# Patient Record
Sex: MALE | Race: WHITE | Employment: OTHER | URBAN - METROPOLITAN AREA
[De-identification: names, ages, dates, MRNs, and addresses within clinical notes are randomized per-mention and may not be internally consistent; named-entity substitution may affect disease eponyms.]

---

## 2018-09-07 ENCOUNTER — HOSPITAL ENCOUNTER (OUTPATIENT)
Dept: LAB | Age: 53
Discharge: HOME | End: 2018-09-07

## 2018-09-07 ENCOUNTER — OFFICE VISIT (OUTPATIENT)
Dept: FAMILY MEDICINE CLINIC | Age: 53
End: 2018-09-07

## 2018-09-07 VITALS
BODY MASS INDEX: 34 KG/M2 | TEMPERATURE: 98.7 F | RESPIRATION RATE: 16 BRPM | WEIGHT: 251 LBS | OXYGEN SATURATION: 98 % | DIASTOLIC BLOOD PRESSURE: 94 MMHG | HEART RATE: 71 BPM | SYSTOLIC BLOOD PRESSURE: 130 MMHG | HEIGHT: 72 IN

## 2018-09-07 DIAGNOSIS — Z13.1 SCREENING FOR DIABETES MELLITUS: ICD-10-CM

## 2018-09-07 DIAGNOSIS — R07.9 CHEST PAIN, UNSPECIFIED TYPE: ICD-10-CM

## 2018-09-07 DIAGNOSIS — Z12.5 SCREENING FOR PROSTATE CANCER: ICD-10-CM

## 2018-09-07 DIAGNOSIS — Z00.00 ROUTINE MEDICAL EXAM: Primary | ICD-10-CM

## 2018-09-07 DIAGNOSIS — E78.5 HYPERLIPIDEMIA, UNSPECIFIED HYPERLIPIDEMIA TYPE: ICD-10-CM

## 2018-09-07 DIAGNOSIS — R21 RASH: ICD-10-CM

## 2018-09-07 DIAGNOSIS — R03.0 ELEVATED BLOOD PRESSURE READING: ICD-10-CM

## 2018-09-07 DIAGNOSIS — R13.19 OTHER DYSPHAGIA: ICD-10-CM

## 2018-09-07 DIAGNOSIS — Z12.11 SCREENING FOR COLON CANCER: ICD-10-CM

## 2018-09-07 DIAGNOSIS — B35.1 ONYCHOMYCOSIS: ICD-10-CM

## 2018-09-07 DIAGNOSIS — Z23 NEED FOR TDAP VACCINATION: ICD-10-CM

## 2018-09-07 LAB — XX-LABCORP SPECIMEN COL,LCBCF: NORMAL

## 2018-09-07 PROCEDURE — 99001 SPECIMEN HANDLING PT-LAB: CPT | Performed by: FAMILY MEDICINE

## 2018-09-07 RX ORDER — CETIRIZINE HCL 10 MG
10 TABLET ORAL AS NEEDED
COMMUNITY
End: 2021-08-06

## 2018-09-07 NOTE — PROGRESS NOTES
Subjective:   Jemal Minor is a 47 y.o. male presenting for his annual checkup. See other note for acute complains. Sexually active. ROS:  Feeling well. No dyspnea or chest pain on exertion at this time . No abdominal pain, change in bowel habits, black or bloody stools. No urinary tract or prostatic symptoms. No neurological complaints. Patient Active Problem List    Diagnosis Date Noted    Hyperlipidemia 09/07/2018    Onychomycosis 09/07/2018    Dysphagia 01/12/2011    Family history of ischemic heart disease 01/12/2011     Current Outpatient Prescriptions   Medication Sig Dispense Refill    cetirizine (ZYRTEC) 10 mg tablet Take  by mouth. Allergies   Allergen Reactions    Pcn [Penicillins] Unknown (comments)    Statins-Hmg-Coa Reductase Inhibitors Other (comments)     Generalize fatigue and body ache. Probably taken simvastatin. Past Medical History:   Diagnosis Date    Hypercholesterolemia      Past Surgical History:   Procedure Laterality Date    HX ORTHOPAEDIC      ankle, arm    HX WISDOM TEETH EXTRACTION       Family History   Problem Relation Age of Onset    Diabetes Father     Heart Attack Father 61    Stroke Father 64    Hypertension Father    [place] Spell Arthritis-rheumatoid Mother     Dementia Maternal Grandmother     Heart Disease Maternal Grandfather 54     Social History   Substance Use Topics    Smoking status: Never Smoker    Smokeless tobacco: Never Used    Alcohol use Yes       Objective:     Visit Vitals    BP (!) 130/94 (BP 1 Location: Left arm, BP Patient Position: Sitting)    Pulse 71    Temp 98.7 °F (37.1 °C) (Oral)    Resp 16    Ht 6' (1.829 m)    Wt 251 lb (113.9 kg)    SpO2 98%    BMI 34.04 kg/m2     The patient appears well, alert, oriented x 3, in no distress. ENT normal.  Neck supple. No adenopathy or thyromegaly. BRITANY. Lungs are clear, good air entry, no wheezes, rhonchi or rales.  S1 and S2 normal, no murmurs, regular rate and rhythm. Abdomen is soft without tenderness, guarding, mass or organomegaly.  exam: no penile lesions or discharge, no testicular masses or tenderness, no hernias, rectum normal, no masses, prostate normal size, symmetric, no nodules or tenderness. External hemorrhoid noted. Extremities show no edema, normal peripheral pulses. Neurological is normal without focal findings.  exam done in presence of nurse LG. Assessment/Plan:       ICD-10-CM ICD-9-CM    1. Routine medical exam Z00.00 V70.0    2. Screening for colon cancer Z12.11 V76.51 REFERRAL TO GASTROENTEROLOGY      CANCELED: REFERRAL TO GASTROENTEROLOGY   3. Screening for prostate cancer Z12.5 V76.44 PSA SCREENING   4. BMI 34.0-34.9,adult Z68.34 V85.34    5. Screening for diabetes mellitus Z13.1 V77.1 HEMOGLOBIN A1C WITH EAG   6. Need for Tdap vaccination Z23 V06.1 TETANUS, DIPHTHERIA TOXOIDS AND ACELLULAR PERTUSSIS VACCINE (TDAP), IN INDIVIDS. >=7, IM   Pt understood and agree with the plan   Review    Follow-up Disposition:  Return in about 3 months (around 12/7/2018). Radha Farmer

## 2018-09-07 NOTE — PATIENT INSTRUCTIONS
Chest Pain: Care Instructions  Your Care Instructions    There are many things that can cause chest pain. Some are not serious and will get better on their own in a few days. But some kinds of chest pain need more testing and treatment. Your doctor may have recommended a follow-up visit in the next 8 to 12 hours. If you are not getting better, you may need more tests or treatment. Even though your doctor has released you, you still need to watch for any problems. The doctor carefully checked you, but sometimes problems can develop later. If you have new symptoms or if your symptoms do not get better, get medical care right away. If you have worse or different chest pain or pressure that lasts more than 5 minutes or you passed out (lost consciousness), call 911 or seek other emergency help right away. A medical visit is only one step in your treatment. Even if you feel better, you still need to do what your doctor recommends, such as going to all suggested follow-up appointments and taking medicines exactly as directed. This will help you recover and help prevent future problems. How can you care for yourself at home? · Rest until you feel better. · Take your medicine exactly as prescribed. Call your doctor if you think you are having a problem with your medicine. · Do not drive after taking a prescription pain medicine. When should you call for help? Call 911 if:    · You passed out (lost consciousness).     · You have severe difficulty breathing.     · You have symptoms of a heart attack. These may include:  ¨ Chest pain or pressure, or a strange feeling in your chest.  ¨ Sweating. ¨ Shortness of breath. ¨ Nausea or vomiting. ¨ Pain, pressure, or a strange feeling in your back, neck, jaw, or upper belly or in one or both shoulders or arms. ¨ Lightheadedness or sudden weakness. ¨ A fast or irregular heartbeat.   After you call 911, the  may tell you to chew 1 adult-strength or 2 to 4 low-dose aspirin. Wait for an ambulance. Do not try to drive yourself.    Call your doctor today if:    · You have any trouble breathing.     · Your chest pain gets worse.     · You are dizzy or lightheaded, or you feel like you may faint.     · You are not getting better as expected.     · You are having new or different chest pain. Where can you learn more? Go to http://swapnil-otilia.info/. Enter A120 in the search box to learn more about \"Chest Pain: Care Instructions. \"  Current as of: November 20, 2017  Content Version: 11.7  © 9660-5467 [employer]. Care instructions adapted under license by [employer] (which disclaims liability or warranty for this information). If you have questions about a medical condition or this instruction, always ask your healthcare professional. [address] any warranty or liability for your use of this information. Low Sodium Diet (2,000 Milligram): Care Instructions  Your Care Instructions    Too much sodium causes your body to hold on to extra water. This can raise your blood pressure and force your heart and kidneys to work harder. In very serious cases, this could cause you to be put in the hospital. It might even be life-threatening. By limiting sodium, you will feel better and lower your risk of serious problems. The most common source of sodium is salt. People get most of the salt in their diet from canned, prepared, and packaged foods. Fast food and restaurant meals also are very high in sodium. Your doctor will probably limit your sodium to less than 2,000 milligrams (mg) a day. This limit counts all the sodium in prepared and packaged foods and any salt you add to your food. Follow-up care is a key part of your treatment and safety. Be sure to make and go to all appointments, and call your doctor if you are having problems.  It's also a good idea to know your test results and keep a list of the medicines you take. How can you care for yourself at home? Read food labels  · Read labels on cans and food packages. The labels tell you how much sodium is in each serving. Make sure that you look at the serving size. If you eat more than the serving size, you have eaten more sodium. · Food labels also tell you the Percent Daily Value for sodium. Choose products with low Percent Daily Values for sodium. · Be aware that sodium can come in forms other than salt, including monosodium glutamate (MSG), sodium citrate, and sodium bicarbonate (baking soda). MSG is often added to Asian food. When you eat out, you can sometimes ask for food without MSG or added salt. Buy low-sodium foods  · Buy foods that are labeled \"unsalted\" (no salt added), \"sodium-free\" (less than 5 mg of sodium per serving), or \"low-sodium\" (less than 140 mg of sodium per serving). Foods labeled \"reduced-sodium\" and \"light sodium\" may still have too much sodium. Be sure to read the label to see how much sodium you are getting. · Buy fresh vegetables, or frozen vegetables without added sauces. Buy low-sodium versions of canned vegetables, soups, and other canned goods. Prepare low-sodium meals  · Cut back on the amount of salt you use in cooking. This will help you adjust to the taste. Do not add salt after cooking. One teaspoon of salt has about 2,300 mg of sodium. · Take the salt shaker off the table. · Flavor your food with garlic, lemon juice, onion, vinegar, herbs, and spices. Do not use soy sauce, lite soy sauce, steak sauce, onion salt, garlic salt, celery salt, mustard, or ketchup on your food. · Use low-sodium salad dressings, sauces, and ketchup. Or make your own salad dressings and sauces without adding salt. · Use less salt (or none) when recipes call for it. You can often use half the salt a recipe calls for without losing flavor. Other foods such as rice, pasta, and grains do not need added salt.   · Rinse canned vegetables, and cook them in fresh water. This removes some-but not all-of the salt. · Avoid water that is naturally high in sodium or that has been treated with water softeners, which add sodium. Call your local water company to find out the sodium content of your water supply. If you buy bottled water, read the label and choose a sodium-free brand. Avoid high-sodium foods  · Avoid eating:  ¨ Smoked, cured, salted, and canned meat, fish, and poultry. ¨ Ham, barrett, hot dogs, and luncheon meats. ¨ Regular, hard, and processed cheese and regular peanut butter. ¨ Crackers with salted tops, and other salted snack foods such as pretzels, chips, and salted popcorn. ¨ Frozen prepared meals, unless labeled low-sodium. ¨ Canned and dried soups, broths, and bouillon, unless labeled sodium-free or low-sodium. ¨ Canned vegetables, unless labeled sodium-free or low-sodium. ¨ [place] fries, pizza, tacos, and other fast foods. ¨ Pickles, olives, ketchup, and other condiments, especially soy sauce, unless labeled sodium-free or low-sodium. Where can you learn more? Go to http://swapnil-otilia.info/. Enter B084 in the search box to learn more about \"Low Sodium Diet (2,000 Milligram): Care Instructions. \"  Current as of: May 12, 2017  Content Version: 11.7  © 3516-4940 [employer]. Care instructions adapted under license by [employer] (which disclaims liability or warranty for this information). If you have questions about a medical condition or this instruction, always ask your healthcare professional. [place] [address] any warranty or liability for your use of this information. Learning About Low-Fat Eating  What is low-fat eating? Most food has some fat in it. Your body needs some fat to be healthy. But some kinds of fats are healthier than others. In a low-fat eating plan, you try to choose healthier fats and eat fewer unhealthy fats.  Healthy fats include olive and canola oil. Try to avoid eating too much saturated fat (such as in cheese and meats) and trans fat (a type of fat found in many packaged snack foods and other baked goods). You do not need to cut all fat from your diet. But you can make healthier choices about the types and amount of fat you eat. Even though it is a good idea to choose healthier fats, it is still important to be careful of how much fat you eat, because all fats are high in calories. What are the different types of fats? Unhealthy fats  · Saturated fat. Saturated fats are mostly in animal foods, such as meat and dairy foods. Tropical oils, such as coconut oil, palm oil, and cocoa butter, are also saturated fats. · Trans fat. Trans fats include shortening, partially hydrogenated vegetable oils, and hydrogenated vegetable oils. Trans fats are made when a liquid fat is turned into a solid fat (for example, when corn oil is made into stick margarine). They are in many processed foods, such as cookies, crackers, and snack foods. Healthy fats  · Monounsaturated fat. Monounsaturated fats are liquid at room temperature but get solid when refrigerated. Eating foods that are high in this fat may help lower your \"bad\" (LDL) cholesterol, keep your \"good\" (HDL) cholesterol level up, and lower your chances of getting coronary artery disease. This fat is found in canola oil, olive oil, peanut oil, olives, avocados, nuts, and nut butters. · Polyunsaturated fat. Polyunsaturated fats are liquid at room temperature. They are in safflower, sunflower, and corn oils. They are also the main fat in seafood. Omega-3 fatty acids are types of polyunsaturated fat. Eating fish may lower your chances of getting coronary artery disease. Fatty fish such as salmon and mackerel contain these healthy fatty acids. So do ground flaxseeds and flaxseed oil, soybeans, walnuts, and seeds. Why cut down on unhealthy fats?   Eating foods that contain saturated fats can raise the LDL (\"bad\") cholesterol in your blood. Having a high level of LDL cholesterol increases your chance of hardening of the arteries (atherosclerosis), which can lead to heart disease, heart attack, and stroke. Trans fat raises the level of \"bad\" LDL cholesterol in your blood and may lower the \"good\" HDL cholesterol in your blood. Trans fat can raise your risk of heart disease, heart attack, and stroke. In general:  · No more than 10% of your daily calories should come from saturated fat. This is about 20 grams in a 2,000-calorie diet. · No more than 10% of your daily calories should come from polyunsaturated fat. This is about 20 grams in a 2,000-calorie diet. · Monounsaturated fats can be up to 15% of your daily calories. This is about 25 to 30 grams in a 2,000-calorie diet. If you're not sure how much fat you should be eating or how many calories you need each day to stay at a healthy weight, talk to a registered dietitian. He or she can help you create a plan that's right for you. What can you do to cut down on fat? Foods like cheese, butter, sausage, and desserts can have a lot of unhealthy fats. Try these tips for healthier meals at home and when you eat out. At home  · Fill up on fruits, vegetables, and whole grains. · Think of meat as a side dish instead of as the main part of your meal.  · When you do eat meat, make it extra-lean ground beef (97% lean), ground turkey breast (without skin added), meats with fat trimmed off before cooking, or skinless chicken. · Try main dishes that use whole wheat pasta, brown rice, dried beans, or vegetables. · Use cooking methods that use little or no fat, such as broiling, steaming, or grilling. Use cooking spray instead of oil. If you use oil, use a monounsaturated oil, such as canola or olive oil. · Read food labels on canned, bottled, or packaged foods. Choose those with little saturated fat and no trans fat.   When eating out at a restaurant  · Order foods that are broiled or poached instead of fried or breaded. · Cut back on the amount of butter or margarine that you use on bread. Use small amounts of olive oil instead. · Order sauces, gravies, and salad dressings on the side, and use only a little. · When you order pasta, choose tomato sauce instead of cream sauce. · Ask for salsa with your baked potato instead of sour cream, butter, cheese, or barrett. Where can you learn more? Go to http://swapnil-otiila.info/. Enter V534 in the search box to learn more about \"Learning About Low-Fat Eating. \"  Current as of: May 12, 2017  Content Version: 11.7  © 8161-3953 [employer]. Care instructions adapted under license by [employer] (which disclaims liability or warranty for this information). If you have questions about a medical condition or this instruction, always ask your healthcare professional. [place] [address] any warranty or liability for your use of this information. Eating Healthy Foods: Care Instructions  Your Care Instructions    Eating healthy foods can help lower your risk for disease. Healthy food gives you energy and keeps your heart strong, your brain active, your muscles working, and your bones strong. A healthy diet includes a variety of foods from the basic food groups: grains, vegetables, fruits, milk and milk products, and meat and beans. Some people may eat more of their favorite foods from only one food group and, as a result, miss getting the nutrients they need. So, it is important to pay attention not only to what you eat but also to what you are missing from your diet. You can eat a healthy, balanced diet by making a few small changes. Follow-up care is a key part of your treatment and safety. Be sure to make and go to all appointments, and call your doctor if you are having problems. It's also a good idea to know your test results and keep a list of the medicines you take.   How can you care for yourself at home? Look at what you eat  · Keep a food diary for a week or two and record everything you eat or drink. Track the number of servings you eat from each food group. · For a balanced diet every day, eat a variety of:  ¨ 6 or more ounce-equivalents of grains, such as cereals, breads, crackers, rice, or pasta, every day. An ounce-equivalent is 1 slice of bread, 1 cup of ready-to-eat cereal, or ½ cup of cooked rice, cooked pasta, or cooked cereal.  ¨ 2½ cups of vegetables, especially:  § Dark-green vegetables such as broccoli and spinach. § Orange vegetables such as carrots and sweet potatoes. § Dry beans (such as rees and kidney beans) and peas (such as lentils). ¨ 2 cups of fresh, frozen, or canned fruit. A small apple or 1 banana or orange equals 1 cup. ¨ 3 cups of nonfat or low-fat milk, yogurt, or other milk products. ¨ 5½ ounces of meat and beans, such as chicken, fish, lean meat, beans, nuts, and seeds. One egg, 1 tablespoon of peanut butter, ½ ounce nuts or seeds, or ¼ cup of cooked beans equals 1 ounce of meat. · Learn how to read food labels for serving sizes and ingredients. Fast-food and convenience-food meals often contain few or no fruits or vegetables. Make sure you eat some fruits and vegetables to make the meal more nutritious. · Look at your food diary. For each food group, add up what you have eaten and then divide the total by the number of days. This will give you an idea of how much you are eating from each food group. See if you can find some ways to change your diet to make it more healthy. Start small  · Do not try to make dramatic changes to your diet all at once. You might feel that you are missing out on your favorite foods and then be more likely to fail. · Start slowly, and gradually change your habits. Try some of the following:  ¨ Use whole wheat bread instead of white bread. ¨ Use nonfat or low-fat milk instead of whole milk.   ¨ Eat brown rice instead of white rice, and eat whole wheat pasta instead of white-flour pasta. ¨ Try low-fat cheeses and low-fat yogurt. ¨ Add more fruits and vegetables to meals and have them for snacks. ¨ Add lettuce, tomato, cucumber, and onion to sandwiches. ¨ Add fruit to yogurt and cereal.  Enjoy food  · You can still eat your favorite foods. You just may need to eat less of them. If your favorite foods are high in fat, salt, and sugar, limit how often you eat them, but do not cut them out entirely. · Eat a wide variety of foods. Make healthy choices when eating out  · The type of restaurant you choose can help you make healthy choices. Even fast-food chains are now offering more low-fat or healthier choices on the menu. · Choose smaller portions, or take half of your meal home. · When eating out, try:  ¨ A veggie pizza with a whole wheat crust or grilled chicken (instead of sausage or pepperoni). ¨ Pasta with roasted vegetables, grilled chicken, or marinara sauce instead of cream sauce. ¨ A vegetable wrap or grilled chicken wrap. ¨ Broiled or poached food instead of fried or breaded items. Make healthy choices easy  · Buy packaged, prewashed, ready-to-eat fresh vegetables and fruits, such as baby carrots, salad mixes, and chopped or shredded broccoli and cauliflower. · Buy packaged, presliced fruits, such as melon or pineapple. · Choose 100% fruit or vegetable juice instead of soda. Limit juice intake to 4 to 6 oz (½ to ¾ cup) a day. · Blend low-fat yogurt, fruit juice, and canned or frozen fruit to make a smoothie for breakfast or a snack. Where can you learn more? Go to http://swapnil-otilia.info/. Enter T756 in the search box to learn more about \"Eating Healthy Foods: Care Instructions. \"  Current as of: May 12, 2017  Content Version: 11.7  © 3952-8118 [email or web address].  Care instructions adapted under license by [employer] (which disclaims liability or warranty for this information). If you have questions about a medical condition or this instruction, always ask your healthcare professional. [address] any warranty or liability for your use of this information. Learning About Physical Activity  What is physical activity? Physical activity is any kind of activity that gets your body moving. The types of physical activity that can help you get fit and stay healthy include:  · Aerobic or \"cardio\" activities that make your heart beat faster and make you breathe harder, such as brisk walking, riding a bike, or running. Aerobic activities strengthen your heart and lungs and build up your endurance. · Strength training activities that make your muscles work against, or \"resist,\" something, such as lifting weights or doing push-ups. These activities help tone and strengthen your muscles. · Stretches that allow you to move your joints and muscles through their full range of motion. Stretching helps you be more flexible and avoid injury. What are the benefits of physical activity? Being active is one of the best things you can do to get fit and stay healthy. It helps you to:  · Feel stronger and have more energy to do all the things you like to do. · Focus better at school or work and perform better in sports. · Feel, think, and sleep better. · Reach and stay at a healthy weight. · Lose fat and build lean muscle. · Lower your risk for serious health problems. · Keep your bones, muscles, and joints strong. Being fit lets you do more physical activity. And it lets you work out harder without as much effort. How can you make physical activity part of your life? Get at least 30 minutes of exercise on most days of the week. Walking is a good choice. You also may want to do other activities, such as running, swimming, cycling, or playing tennis or team sports.   Pick activities that you like-ones that make your heart beat faster, your muscles stronger, and your muscles and joints more flexible. If you find more than one thing you like doing, do them all. You don't have to do the same thing every day. Get your heart pumping every day. Any activity that makes your heart beat faster and keeps it at that rate for a while counts. Here are some great ways to get your heart beating faster:  · Go for a brisk walk, run, or bike ride. · Go for a hike or swim. · Go in-line skating. · Play a game of touch football, basketball, or soccer. · Ride a bike. · Play tennis or racquetball. · Climb stairs. Even some household chores can be aerobic-just do them at a faster pace. Vacuuming, raking or mowing the lawn, sweeping the garage, and washing and waxing the car all can help get your heart rate up. Strengthen your muscles during the week. You don't have to lift heavy weights or grow big, bulky muscles to get stronger. Doing a few simple activities that make your muscles work against, or \"resist,\" something can help you get stronger. For example, you can:  · Do push-ups or sit-ups, which use your own body weight as resistance. · Lift weights or dumbbells or use stretch bands at home or in a gym or community center. Stretch your muscles often. Stretching will help you as you become more active. It can help you stay flexible, loosen tight muscles, and avoid injury. It can also help improve your balance and posture and can be a great way to relax. Be sure to stretch the muscles you'll be using when you work out. It's best to warm your muscles slightly before you stretch them. Walk or do some other light aerobic activity for a few minutes, and then start stretching. When you stretch your muscles:  · Do it slowly. Stretching is not about going fast or making sudden movements. · Don't push or bounce during a stretch. · Hold each stretch for at least 15 to 30 seconds, if you can. You should feel a stretch in the muscle, but not pain.   · Breathe out as you do the stretch. Then breathe in as you hold the stretch. Don't hold your breath. If you're worried about how more activity might affect your health, have a checkup before you start. Follow any special advice your doctor gives you for getting a smart start. Where can you learn more? Go to http://swapnil-otilia.info/. Enter D724 in the search box to learn more about \"Learning About Physical Activity. \"  Current as of: December 7, 2017  Content Version: 11.7  © 7525-8499 [employer]. Care instructions adapted under license by [employer] (which disclaims liability or warranty for this information). If you have questions about a medical condition or this instruction, always ask your healthcare professional. [address] any warranty or liability for your use of this information. Toenail Fungus: Care Instructions  Your Care Instructions  A toenail that is infected by a fungus usually turns white or yellow. As the fungus spreads, the nail turns a darker color and gets thicker, and its edges start to turn ragged and crumble. A bad infection can cause toe pain, and the nail may pull away from the toe. Toenails that are exposed to moisture and warmth a lot are more likely to get infected by a fungus. This can happen from wearing sweaty shoes often and from walking barefoot on shower floors. It is hard to treat toenail fungus, and the infection can return after it has cleared up. But medicines can sometimes get rid of toenail fungus for good. If the infection is very bad, or if it causes a lot of pain, you may need to have the nail removed. Follow-up care is a key part of your treatment and safety. Be sure to make and go to all appointments, and call your doctor if you are having problems. It's also a good idea to know your test results and keep a list of the medicines you take. How can you care for yourself at home?   · Take your medicines exactly as prescribed. Call your doctor if you have any problems with your medicine. You will get more details on the specific medicines your doctor prescribes. · If your doctor gave you a cream or liquid to put on your toenail, use it exactly as directed. · Wash your feet often, and wash your hands after touching your feet. · Keep your toenails clean and dry. Dry your feet completely after you bathe and before you put on shoes and socks. · Keep your toenails trimmed. · Change socks often. Wear dry socks that absorb moisture. · Do not go barefoot in public places. · Use a spray or powder that fights fungus on your feet and in your shoes. · Do not pick at the skin around your nails. · Do not use nail polish or fake nails on your toenails. When should you call for help? Call your doctor now or seek immediate medical care if:    · You have signs of infection, such as:  ¨ Increased pain, swelling, warmth, or redness. ¨ Red streaks leading from the site. ¨ Pus draining from the site. ¨ A fever.     · You have new or increased toe pain.    Watch closely for changes in your health, and be sure to contact your doctor if:    · You do not get better as expected. Where can you learn more? Go to http://swapnil-otilia.info/. Enter D202 in the search box to learn more about \"Toenail Fungus: Care Instructions. \"  Current as of: October 5, 2017  Content Version: 11.7  © 0849-8511 [employer]. Care instructions adapted under license by [employer] (which disclaims liability or warranty for this information). If you have questions about a medical condition or this instruction, always ask your healthcare professional. [place] [address] any warranty or liability for your use of this information. Tdap (Tetanus, Diphtheria, Pertussis) Vaccine: What You Need to Know  Why get vaccinated? Tetanus, diphtheria, and pertussis are very serious diseases.  Tdap vaccine can protect us from these diseases. And Tdap vaccine given to pregnant women can protect  babies against pertussis. Tetanus (lockjaw) is rare in the [employer] today. It causes painful muscle tightening and stiffness, usually all over the body. · It can lead to tightening of muscles in the head and neck so you can't open your mouth, swallow, or sometimes even breathe. Tetanus kills about 1 out of 10 people who are infected even after receiving the best medical care. Diphtheria is also rare in the [place] today. It can cause a thick coating to form in the back of the throat. · It can lead to breathing problems, heart failure, paralysis, and death. Pertussis (whooping cough) causes severe coughing spells, which can cause difficulty breathing, vomiting, and disturbed sleep. · It can also lead to weight loss, incontinence, and rib fractures. Up to 2 in 100 adolescents and 5 in 100 adults with pertussis are hospitalized or have complications, which could include pneumonia or death. These diseases are caused by bacteria. Diphtheria and pertussis are spread from person to person through secretions from coughing or sneezing. Tetanus enters the body through cuts, scratches, or wounds. Before vaccines, as many as 200,000 cases of diphtheria, 200,000 cases of pertussis, and hundreds of cases of tetanus were reported in the [place] each year. Since vaccination began, reports of cases for tetanus and diphtheria have dropped by about 99% and for pertussis by about 80%. Tdap vaccine  The Tdap vaccine can protect adolescents and adults from tetanus, diphtheria, and pertussis. One dose of Tdap is routinely given at age 6 or 15. People who did not get Tdap at that age should get it as soon as possible. Tdap is especially important for health care professionals and anyone having close contact with a baby younger than 12 months.   Pregnant women should get a dose of Tdap during every pregnancy, to protect the  from pertussis. Infants are most at risk for severe, life-threatening complications from pertussis. Another vaccine, called Td, protects against tetanus and diphtheria, but not pertussis. A Td booster should be given every 10 years. Tdap may be given as one of these boosters if you have never gotten Tdap before. Tdap may also be given after a severe cut or burn to prevent tetanus infection. Your doctor or the person giving you the vaccine can give you more information. Tdap may safely be given at the same time as other vaccines. Some people should not get this vaccine  · A person who has ever had a life-threatening allergic reaction after a previous dose of any diphtheria-, tetanus-, or pertussis-containing vaccine, OR has a severe allergy to any part of this vaccine, should not get Tdap vaccine. Tell the person giving the vaccine about any severe allergies. · Anyone who had coma or long repeated seizures within 7 days after a childhood dose of DTP or DTaP, or a previous dose of Tdap, should not get Tdap, unless a cause other than the vaccine was found. They can still get Td. · Talk to your doctor if you:  ¨ Have seizures or another nervous system problem. ¨ Had severe pain or swelling after any vaccine containing diphtheria, tetanus, or pertussis. ¨ Ever had a condition called Guillain-Barré Syndrome (GBS). ¨ Aren't feeling well on the day the shot is scheduled. Risks  With any medicine, including vaccines, there is a chance of side effects. These are usually mild and go away on their own. Serious reactions are also possible but are rare. Most people who get Tdap vaccine do not have any problems with it.   Mild problems following Tdap  (Did not interfere with activities)  · Pain where the shot was given (about 3 in 4 adolescents or 2 in 3 adults)  · Redness or swelling where the shot was given (about 1 person in 5)  · Mild fever of at least 100.4°F (up to about 1 in 25 adolescents or 1 in 100 adults)  · Headache (about 3 or 4 people in 10)  · Tiredness (about 1 person in 3 or 4)  · Nausea, vomiting, diarrhea, stomachache (up to 1 in 4 adolescents or 1 in 10 adults)  · Chills, sore joints (about 1 person in 10)  · Body aches (about 1 person in 3 or 4)  · Rash, swollen glands (uncommon)  Moderate problems following Tdap  (Interfered with activities, but did not require medical attention)  · Pain where the shot was given (up to 1 in 5 or 6)  · Redness or swelling where the shot was given (up to about 1 in 16 adolescents or 1 in 12 adults)  · Fever over 102°F (about 1 in 100 adolescents or 1 in 250 adults)  · Headache (about 1 in 7 adolescents or 1 in 10 adults)  · Nausea, vomiting, diarrhea, stomachache (up to 1 to 3 people in 100)  · Swelling of the entire arm where the shot was given (up to about 1 in 500)  Severe problems following Tdap  (Unable to perform usual activities; required medical attention)  · Swelling, severe pain, bleeding and redness in the arm where the shot was given (rare)  Problems that could happen after any vaccine:  · People sometimes faint after a medical procedure, including vaccination. Sitting or lying down for about 15 minutes can help prevent fainting, and injuries caused by a fall. Tell your doctor if you feel dizzy or have vision changes or ringing in the ears. · Some people get severe pain in the shoulder and have difficulty moving the arm where a shot was given. This happens very rarely. · Any medication can cause a severe allergic reaction. Such reactions from a vaccine are very rare, estimated at fewer than 1 in a million doses, and would happen within a few minutes to a few hours after the vaccination. As with any medicine, there is a very remote chance of a vaccine causing a serious injury or death. The safety of vaccines is always being monitored. For more information, visit: www.cdc.gov/vaccinesafety. What if there is a serious problem?   What should I look for?  · Look for anything that concerns you, such as signs of a severe allergic reaction, very high fever, or unusual behavior. Signs of a severe allergic reaction can include hives, swelling of the face and throat, difficulty breathing, a fast heartbeat, dizziness, and weakness. These would usually start a few minutes to a few hours after the vaccination. What should I do? · If you think it is a severe allergic reaction or other emergency that can't wait, call 9-1-1 or get the person to the nearest hospital. Otherwise, call your doctor. · Afterward, the reaction should be reported to the Vaccine Adverse Event Reporting System (VAERS). Your doctor might file this report, or you can do it yourself through the VAERS web site at www.vaers. hhs.gov, or by calling 5-757.943.3236. VAERS does not give medical advice. The National Vaccine Injury Compensation Program  The National Vaccine Injury Compensation Program (VICP) is a federal program that was created to compensate people who may have been injured by certain vaccines. Persons who believe they may have been injured by a vaccine can learn about the program and about filing a claim by calling 7-199.356.4049 or visiting the [facility] website at [email or web address]. There is a time limit to file a claim for compensation. How can I learn more? · Ask your doctor. He or she can give you the vaccine package insert or suggest other sources of information. · Call your local or state health department. · Contact the Centers for Disease Control and Prevention (CDC):  ¨ Call 9-446.244.8888 (1-800-CDC-INFO) or  ¨ Visit CDC's website at www.cdc.gov/vaccines  Vaccine Information Statement (Interim)  Tdap Vaccine  (2/24/15)  [address] [facility] [id number]-01  Department of Health and Human Services  Centers for Disease Control and Prevention  Many Vaccine Information Statements are available in [place] and other languages. See www.immunize.org/vis.   Muchas hojas de información sobre vacunas están disponibles en español y en otros idiomas. Visite www.immunize.org/vis. Care instructions adapted under license by [employer] (which disclaims liability or warranty for this information). If you have questions about a medical condition or this instruction, always ask your healthcare professional. [address] any warranty or liability for your use of this information.

## 2018-09-07 NOTE — MR AVS SNAPSHOT
[address] [place] [address] [address] [place] 
[phone] Patient: Carl White MRN: [id number] :[id number] Visit Information Date & Time Provider Department Dept. Phone Encounter #  
 2018 11:00 AM Hilario Monroy, [address] [facility] [phone] [id number] Follow-up Instructions Return in about 3 months (around 2018). Upcoming Health Maintenance Date Due COLONOSCOPY 1984 DTaP/Tdap/Td series (2 - Td) 2016 Influenza Age 5 to Adult 2018 Allergies as of 2018  Review Complete On: 2018 By: Hilario Monroy MD  
  
 Severity Noted Reaction Type Reactions Pcn [Penicillins]  2011    Unknown (comments) Statins-hmg-coa Reductase Inhibitors  2018    Other (comments) Generalize fatigue and body ache. Probably taken simvastatin. Current Immunizations  Reviewed on 2012 Name Date Influenza Vaccine Split 2009 TB Skin Test (PPD) Intradermal 2013 11:28 AM  
 TDAP Vaccine 2006 Not reviewed this visit You Were Diagnosed With   
  
 Codes Comments Routine medical exam    -  Primary ICD-10-CM: Z00.00 ICD-9-CM: V70.0 Chest pain, unspecified type     ICD-10-CM: R07.9 ICD-9-CM: 786.50 Hyperlipidemia, unspecified hyperlipidemia type     ICD-10-CM: E78.5 ICD-9-CM: 272.4 Screening for colon cancer     ICD-10-CM: Z12.11 ICD-9-CM: V76.51 Screening for prostate cancer     ICD-10-CM: Z12.5 ICD-9-CM: V76.44 BMI 34.0-34.9,adult     ICD-10-CM: H49.35 
ICD-9-CM: V85.34 Screening for diabetes mellitus     ICD-10-CM: Z13.1 ICD-9-CM: V77.1 Onychomycosis     ICD-10-CM: B35.1 ICD-9-CM: 110.1 Rash     ICD-10-CM: R21 
ICD-9-CM: 782.1 Elevated blood pressure reading     ICD-10-CM: R03.0 ICD-9-CM: 796.2 Other dysphagia     ICD-10-CM: R13.19 ICD-9-CM: 787.29 Vitals BP Pulse Temp Resp Height(growth percentile) Weight(growth percentile) (!) 130/94 (BP 1 Location: Left arm, BP Patient Position: Sitting) 71 98.7 °F (37.1 °C) (Oral) 16 6' (1.829 m) 251 lb (113.9 kg) SpO2 BMI Smoking Status 98% 34.04 kg/m2 Never Smoker Vitals History BMI and BSA Data Body Mass Index Body Surface Area 34.04 kg/m 2 2.41 m 2 Preferred Pharmacy Pharmacy Name Phone [facility] [phone] Your Updated Medication List  
  
   
This list is accurate as of 9/7/18 11:59 AM.  Always use your most recent med list.  
  
  
  
  
 ZyrTEC 10 mg tablet Generic drug:  cetirizine Take  by mouth. We Performed the Following PSA SCREENING [ [facility]] REFERRAL TO CARDIOLOGY [[facility]77 Custom] REFERRAL TO GASTROENTEROLOGY [CVW43 Custom] Comments:  
 Or first available Follow-up Instructions Return in about 3 months (around 12/7/2018). To-Do List   
 09/07/2018 Lab:  HEMOGLOBIN A1C WITH EAG   
  
 09/07/2018 Lab:  LIPID PANEL Referral Information Referral ID Referred By Referred To  
  
 [id number] [address] [place] [address] Phone: [phone] Fax: [phone] Visits Status Start Date End Date 1 Authorized 9/7/18 9/7/19 If your referral has a status of pending review or denied, additional information will be sent to support the outcome of this decision. Referral ID Referred By Referred To  
 [id number] [facility] R Not Available Visits Status Start Date End Date 1 New Request 9/7/18 9/7/19 If your referral has a status of pending review or denied, additional information will be sent to support the outcome of this decision. Patient Instructions Chest Pain: Care Instructions Your Care Instructions There are many things that can cause chest pain. Some are not serious and will get better on their own in a few days. But some kinds of chest pain need more testing and treatment. Your doctor may have recommended a follow-up visit in the next 8 to 12 hours. If you are not getting better, you may need more tests or treatment. Even though your doctor has released you, you still need to watch for any problems. The doctor carefully checked you, but sometimes problems can develop later. If you have new symptoms or if your symptoms do not get better, get medical care right away. If you have worse or different chest pain or pressure that lasts more than 5 minutes or you passed out (lost consciousness), call 911 or seek other emergency help right away. A medical visit is only one step in your treatment. Even if you feel better, you still need to do what your doctor recommends, such as going to all suggested follow-up appointments and taking medicines exactly as directed. This will help you recover and help prevent future problems. How can you care for yourself at home? · Rest until you feel better. · Take your medicine exactly as prescribed. Call your doctor if you think you are having a problem with your medicine. · Do not drive after taking a prescription pain medicine. When should you call for help? Call 911 if: 
  · You passed out (lost consciousness).  
  · You have severe difficulty breathing.  
  · You have symptoms of a heart attack. These may include: ¨ Chest pain or pressure, or a strange feeling in your chest. 
¨ Sweating. ¨ Shortness of breath. ¨ Nausea or vomiting. ¨ Pain, pressure, or a strange feeling in your back, neck, jaw, or upper belly or in one or both shoulders or arms. ¨ Lightheadedness or sudden weakness. ¨ A fast or irregular heartbeat. After you call 911, the  may tell you to chew 1 adult-strength or 2 to 4 low-dose aspirin. Wait for an ambulance.  Do not try to drive yourself.  
 Call your doctor today if: 
  · You have any trouble breathing.  
  · Your chest pain gets worse.  
  · You are dizzy or lightheaded, or you feel like you may faint.  
  · You are not getting better as expected.  
  · You are having new or different chest pain. Where can you learn more? Go to http://swapnil-otilia.info/. Enter A120 in the search box to learn more about \"Chest Pain: Care Instructions. \" Current as of: November 20, 2017 Content Version: 11.7 © 6047-5557 [employer]. Care instructions adapted under license by [employer] (which disclaims liability or warranty for this information). If you have questions about a medical condition or this instruction, always ask your healthcare professional. [address] any warranty or liability for your use of this information. Low Sodium Diet (2,000 Milligram): Care Instructions Your Care Instructions Too much sodium causes your body to hold on to extra water. This can raise your blood pressure and force your heart and kidneys to work harder. In very serious cases, this could cause you to be put in the hospital. It might even be life-threatening. By limiting sodium, you will feel better and lower your risk of serious problems. The most common source of sodium is salt. People get most of the salt in their diet from canned, prepared, and packaged foods. Fast food and restaurant meals also are very high in sodium. Your doctor will probably limit your sodium to less than 2,000 milligrams (mg) a day. This limit counts all the sodium in prepared and packaged foods and any salt you add to your food. Follow-up care is a key part of your treatment and safety. Be sure to make and go to all appointments, and call your doctor if you are having problems. It's also a good idea to know your test results and keep a list of the medicines you take. How can you care for yourself at home? Read food labels · Read labels on cans and food packages. The labels tell you how much sodium is in each serving. Make sure that you look at the serving size. If you eat more than the serving size, you have eaten more sodium. · Food labels also tell you the Percent Daily Value for sodium. Choose products with low Percent Daily Values for sodium. · Be aware that sodium can come in forms other than salt, including monosodium glutamate (MSG), sodium citrate, and sodium bicarbonate (baking soda). MSG is often added to Asian food. When you eat out, you can sometimes ask for food without MSG or added salt. Buy low-sodium foods · Buy foods that are labeled \"unsalted\" (no salt added), \"sodium-free\" (less than 5 mg of sodium per serving), or \"low-sodium\" (less than 140 mg of sodium per serving). Foods labeled \"reduced-sodium\" and \"light sodium\" may still have too much sodium. Be sure to read the label to see how much sodium you are getting. · Buy fresh vegetables, or frozen vegetables without added sauces. Buy low-sodium versions of canned vegetables, soups, and other canned goods. Prepare low-sodium meals · Cut back on the amount of salt you use in cooking. This will help you adjust to the taste. Do not add salt after cooking. One teaspoon of salt has about 2,300 mg of sodium. · Take the salt shaker off the table. · Flavor your food with garlic, lemon juice, onion, vinegar, herbs, and spices. Do not use soy sauce, lite soy sauce, steak sauce, onion salt, garlic salt, celery salt, mustard, or ketchup on your food. · Use low-sodium salad dressings, sauces, and ketchup. Or make your own salad dressings and sauces without adding salt. · Use less salt (or none) when recipes call for it. You can often use half the salt a recipe calls for without losing flavor. Other foods such as rice, pasta, and grains do not need added salt. · Rinse canned vegetables, and cook them in fresh water.  This removes some-but not all-of the salt. · Avoid water that is naturally high in sodium or that has been treated with water softeners, which add sodium. Call your local water company to find out the sodium content of your water supply. If you buy bottled water, read the label and choose a sodium-free brand. Avoid high-sodium foods · Avoid eating: ¨ Smoked, cured, salted, and canned meat, fish, and poultry. ¨ Ham, barrett, hot dogs, and luncheon meats. ¨ Regular, hard, and processed cheese and regular peanut butter. ¨ Crackers with salted tops, and other salted snack foods such as pretzels, chips, and salted popcorn. ¨ Frozen prepared meals, unless labeled low-sodium. ¨ Canned and dried soups, broths, and bouillon, unless labeled sodium-free or low-sodium. ¨ Canned vegetables, unless labeled sodium-free or low-sodium. ¨ [place] fries, pizza, tacos, and other fast foods. ¨ Pickles, olives, ketchup, and other condiments, especially soy sauce, unless labeled sodium-free or low-sodium. Where can you learn more? Go to http://swapnilCareWireotilia.info/. Enter X758 in the search box to learn more about \"Low Sodium Diet (2,000 Milligram): Care Instructions. \" Current as of: May 12, 2017 Content Version: 11.7 © 0196-7089 [employer]. Care instructions adapted under license by [employer] (which disclaims liability or warranty for this information). If you have questions about a medical condition or this instruction, always ask your healthcare professional. [place] [address] any warranty or liability for your use of this information. Learning About Low-Fat Eating What is low-fat eating? Most food has some fat in it. Your body needs some fat to be healthy. But some kinds of fats are healthier than others. In a low-fat eating plan, you try to choose healthier fats and eat fewer unhealthy fats. Healthy fats include olive and canola oil.  Try to avoid eating too much saturated fat (such as in cheese and meats) and trans fat (a type of fat found in many packaged snack foods and other baked goods). You do not need to cut all fat from your diet. But you can make healthier choices about the types and amount of fat you eat. Even though it is a good idea to choose healthier fats, it is still important to be careful of how much fat you eat, because all fats are high in calories. What are the different types of fats? Unhealthy fats · Saturated fat. Saturated fats are mostly in animal foods, such as meat and dairy foods. Tropical oils, such as coconut oil, palm oil, and cocoa butter, are also saturated fats. · Trans fat. Trans fats include shortening, partially hydrogenated vegetable oils, and hydrogenated vegetable oils. Trans fats are made when a liquid fat is turned into a solid fat (for example, when corn oil is made into stick margarine). They are in many processed foods, such as cookies, crackers, and snack foods. Healthy fats · Monounsaturated fat. Monounsaturated fats are liquid at room temperature but get solid when refrigerated. Eating foods that are high in this fat may help lower your \"bad\" (LDL) cholesterol, keep your \"good\" (HDL) cholesterol level up, and lower your chances of getting coronary artery disease. This fat is found in canola oil, olive oil, peanut oil, olives, avocados, nuts, and nut butters. · Polyunsaturated fat. Polyunsaturated fats are liquid at room temperature. They are in safflower, sunflower, and corn oils. They are also the main fat in seafood. Omega-3 fatty acids are types of polyunsaturated fat. Eating fish may lower your chances of getting coronary artery disease. Fatty fish such as salmon and mackerel contain these healthy fatty acids. So do ground flaxseeds and flaxseed oil, soybeans, walnuts, and seeds. Why cut down on unhealthy fats?  
Eating foods that contain saturated fats can raise the LDL (\"bad\") cholesterol in your blood. Having a high level of LDL cholesterol increases your chance of hardening of the arteries (atherosclerosis), which can lead to heart disease, heart attack, and stroke. Trans fat raises the level of \"bad\" LDL cholesterol in your blood and may lower the \"good\" HDL cholesterol in your blood. Trans fat can raise your risk of heart disease, heart attack, and stroke. In general: · No more than 10% of your daily calories should come from saturated fat. This is about 20 grams in a 2,000-calorie diet. · No more than 10% of your daily calories should come from polyunsaturated fat. This is about 20 grams in a 2,000-calorie diet. · Monounsaturated fats can be up to 15% of your daily calories. This is about 25 to 30 grams in a 2,000-calorie diet. If you're not sure how much fat you should be eating or how many calories you need each day to stay at a healthy weight, talk to a registered dietitian. He or she can help you create a plan that's right for you. What can you do to cut down on fat? Foods like cheese, butter, sausage, and desserts can have a lot of unhealthy fats. Try these tips for healthier meals at home and when you eat out. At home · Fill up on fruits, vegetables, and whole grains. · Think of meat as a side dish instead of as the main part of your meal. 
· When you do eat meat, make it extra-lean ground beef (97% lean), ground turkey breast (without skin added), meats with fat trimmed off before cooking, or skinless chicken. · Try main dishes that use whole wheat pasta, brown rice, dried beans, or vegetables. · Use cooking methods that use little or no fat, such as broiling, steaming, or grilling. Use cooking spray instead of oil. If you use oil, use a monounsaturated oil, such as canola or olive oil. · Read food labels on canned, bottled, or packaged foods. Choose those with little saturated fat and no trans fat. When eating out at a restaurant · Order foods that are broiled or poached instead of fried or breaded. · Cut back on the amount of butter or margarine that you use on bread. Use small amounts of olive oil instead. · Order sauces, gravies, and salad dressings on the side, and use only a little. · When you order pasta, choose tomato sauce instead of cream sauce. · Ask for salsa with your baked potato instead of sour cream, butter, cheese, or barrett. Where can you learn more? Go to http://swapnil-otilia.info/. Enter X590 in the search box to learn more about \"Learning About Low-Fat Eating. \" Current as of: May 12, 2017 Content Version: 11.7 © 3751-6821 [employer]. Care instructions adapted under license by [employer] (which disclaims liability or warranty for this information). If you have questions about a medical condition or this instruction, always ask your healthcare professional. [place] [address] any warranty or liability for your use of this information. Eating Healthy Foods: Care Instructions Your Care Instructions Eating healthy foods can help lower your risk for disease. Healthy food gives you energy and keeps your heart strong, your brain active, your muscles working, and your bones strong. A healthy diet includes a variety of foods from the basic food groups: grains, vegetables, fruits, milk and milk products, and meat and beans. Some people may eat more of their favorite foods from only one food group and, as a result, miss getting the nutrients they need. So, it is important to pay attention not only to what you eat but also to what you are missing from your diet. You can eat a healthy, balanced diet by making a few small changes. Follow-up care is a key part of your treatment and safety. Be sure to make and go to all appointments, and call your doctor if you are having problems.  It's also a good idea to know your test results and keep a list of the medicines you take. How can you care for yourself at home? Look at what you eat · Keep a food diary for a week or two and record everything you eat or drink. Track the number of servings you eat from each food group. · For a balanced diet every day, eat a variety of: ¨ 6 or more ounce-equivalents of grains, such as cereals, breads, crackers, rice, or pasta, every day. An ounce-equivalent is 1 slice of bread, 1 cup of ready-to-eat cereal, or ½ cup of cooked rice, cooked pasta, or cooked cereal. 
¨ 2½ cups of vegetables, especially: § Dark-green vegetables such as broccoli and spinach. § Orange vegetables such as carrots and sweet potatoes. § Dry beans (such as rees and kidney beans) and peas (such as lentils). ¨ 2 cups of fresh, frozen, or canned fruit. A small apple or 1 banana or orange equals 1 cup. ¨ 3 cups of nonfat or low-fat milk, yogurt, or other milk products. ¨ 5½ ounces of meat and beans, such as chicken, fish, lean meat, beans, nuts, and seeds. One egg, 1 tablespoon of peanut butter, ½ ounce nuts or seeds, or ¼ cup of cooked beans equals 1 ounce of meat. · Learn how to read food labels for serving sizes and ingredients. Fast-food and convenience-food meals often contain few or no fruits or vegetables. Make sure you eat some fruits and vegetables to make the meal more nutritious. · Look at your food diary. For each food group, add up what you have eaten and then divide the total by the number of days. This will give you an idea of how much you are eating from each food group. See if you can find some ways to change your diet to make it more healthy. Start small · Do not try to make dramatic changes to your diet all at once. You might feel that you are missing out on your favorite foods and then be more likely to fail. · Start slowly, and gradually change your habits. Try some of the following: ¨ Use whole wheat bread instead of white bread. ¨ Use nonfat or low-fat milk instead of whole milk. ¨ Eat brown rice instead of white rice, and eat whole wheat pasta instead of white-flour pasta. ¨ Try low-fat cheeses and low-fat yogurt. ¨ Add more fruits and vegetables to meals and have them for snacks. ¨ Add lettuce, tomato, cucumber, and onion to sandwiches. ¨ Add fruit to yogurt and cereal. 
Enjoy food · You can still eat your favorite foods. You just may need to eat less of them. If your favorite foods are high in fat, salt, and sugar, limit how often you eat them, but do not cut them out entirely. · Eat a wide variety of foods. Make healthy choices when eating out · The type of restaurant you choose can help you make healthy choices. Even fast-food chains are now offering more low-fat or healthier choices on the menu. · Choose smaller portions, or take half of your meal home. · When eating out, try: ¨ A veggie pizza with a whole wheat crust or grilled chicken (instead of sausage or pepperoni). ¨ Pasta with roasted vegetables, grilled chicken, or marinara sauce instead of cream sauce. ¨ A vegetable wrap or grilled chicken wrap. ¨ Broiled or poached food instead of fried or breaded items. Make healthy choices easy · Buy packaged, prewashed, ready-to-eat fresh vegetables and fruits, such as baby carrots, salad mixes, and chopped or shredded broccoli and cauliflower. · Buy packaged, presliced fruits, such as melon or pineapple. · Choose 100% fruit or vegetable juice instead of soda. Limit juice intake to 4 to 6 oz (½ to ¾ cup) a day. · Blend low-fat yogurt, fruit juice, and canned or frozen fruit to make a smoothie for breakfast or a snack. Where can you learn more? Go to http://swapnil-otilia.info/. Enter T756 in the search box to learn more about \"Eating Healthy Foods: Care Instructions. \" Current as of: May 12, 2017 Content Version: 11.7 © 7947-3321 [employer].  Care instructions adapted under license by [address] (which disclaims liability or warranty for this information). If you have questions about a medical condition or this instruction, always ask your healthcare professional. [address] any warranty or liability for your use of this information. Learning About Physical Activity What is physical activity? Physical activity is any kind of activity that gets your body moving. The types of physical activity that can help you get fit and stay healthy include: · Aerobic or \"cardio\" activities that make your heart beat faster and make you breathe harder, such as brisk walking, riding a bike, or running. Aerobic activities strengthen your heart and lungs and build up your endurance. · Strength training activities that make your muscles work against, or \"resist,\" something, such as lifting weights or doing push-ups. These activities help tone and strengthen your muscles. · Stretches that allow you to move your joints and muscles through their full range of motion. Stretching helps you be more flexible and avoid injury. What are the benefits of physical activity? Being active is one of the best things you can do to get fit and stay healthy. It helps you to: · Feel stronger and have more energy to do all the things you like to do. · Focus better at school or work and perform better in sports. · Feel, think, and sleep better. · Reach and stay at a healthy weight. · Lose fat and build lean muscle. · Lower your risk for serious health problems. · Keep your bones, muscles, and joints strong. Being fit lets you do more physical activity. And it lets you work out harder without as much effort. How can you make physical activity part of your life? Get at least 30 minutes of exercise on most days of the week. Walking is a good choice. You also may want to do other activities, such as running, swimming, cycling, or playing tennis or team sports. Pick activities that you like-ones that make your heart beat faster, your muscles stronger, and your muscles and joints more flexible. If you find more than one thing you like doing, do them all. You don't have to do the same thing every day. Get your heart pumping every day. Any activity that makes your heart beat faster and keeps it at that rate for a while counts. Here are some great ways to get your heart beating faster: · Go for a brisk walk, run, or bike ride. · Go for a hike or swim. · Go in-line skating. · Play a game of touch football, basketball, or soccer. · Ride a bike. · Play tennis or racquetball. · Climb stairs. Even some household chores can be aerobic-just do them at a faster pace. Vacuuming, raking or mowing the lawn, sweeping the garage, and washing and waxing the car all can help get your heart rate up. Strengthen your muscles during the week. You don't have to lift heavy weights or grow big, bulky muscles to get stronger. Doing a few simple activities that make your muscles work against, or \"resist,\" something can help you get stronger. For example, you can: · Do push-ups or sit-ups, which use your own body weight as resistance. · Lift weights or dumbbells or use stretch bands at home or in a gym or community center. Stretch your muscles often. Stretching will help you as you become more active. It can help you stay flexible, loosen tight muscles, and avoid injury. It can also help improve your balance and posture and can be a great way to relax. Be sure to stretch the muscles you'll be using when you work out. It's best to warm your muscles slightly before you stretch them. Walk or do some other light aerobic activity for a few minutes, and then start stretching. When you stretch your muscles: · Do it slowly. Stretching is not about going fast or making sudden movements. · Don't push or bounce during a stretch. · Hold each stretch for at least 15 to 30 seconds, if you can. You should feel a stretch in the muscle, but not pain. · Breathe out as you do the stretch. Then breathe in as you hold the stretch. Don't hold your breath. If you're worried about how more activity might affect your health, have a checkup before you start. Follow any special advice your doctor gives you for getting a smart start. Where can you learn more? Go to http://swapnil-otilia.info/. Enter V041 in the search box to learn more about \"Learning About Physical Activity. \" Current as of: December 7, 2017 Content Version: 11.7 © 9711-3158 [employer]on. Care instructions adapted under license by UpDown (which disclaims liability or warranty for this information). If you have questions about a medical condition or this instruction, always ask your healthcare professional. [place] [address] any warranty or liability for your use of this information. Toenail Fungus: Care Instructions Your Care Instructions A toenail that is infected by a fungus usually turns white or yellow. As the fungus spreads, the nail turns a darker color and gets thicker, and its edges start to turn ragged and crumble. A bad infection can cause toe pain, and the nail may pull away from the toe. Toenails that are exposed to moisture and warmth a lot are more likely to get infected by a fungus. This can happen from wearing sweaty shoes often and from walking barefoot on shower floors. It is hard to treat toenail fungus, and the infection can return after it has cleared up. But medicines can sometimes get rid of toenail fungus for good. If the infection is very bad, or if it causes a lot of pain, you may need to have the nail removed. Follow-up care is a key part of your treatment and safety.  Be sure to make and go to all appointments, and call your doctor if you are having problems. It's also a good idea to know your test results and keep a list of the medicines you take. How can you care for yourself at home? · Take your medicines exactly as prescribed. Call your doctor if you have any problems with your medicine. You will get more details on the specific medicines your doctor prescribes. · If your doctor gave you a cream or liquid to put on your toenail, use it exactly as directed. · Wash your feet often, and wash your hands after touching your feet. · Keep your toenails clean and dry. Dry your feet completely after you bathe and before you put on shoes and socks. · Keep your toenails trimmed. · Change socks often. Wear dry socks that absorb moisture. · Do not go barefoot in public places. · Use a spray or powder that fights fungus on your feet and in your shoes. · Do not pick at the skin around your nails. · Do not use nail polish or fake nails on your toenails. When should you call for help? Call your doctor now or seek immediate medical care if: 
  · You have signs of infection, such as: 
¨ Increased pain, swelling, warmth, or redness. ¨ Red streaks leading from the site. ¨ Pus draining from the site. ¨ A fever.  
  · You have new or increased toe pain.  
 Watch closely for changes in your health, and be sure to contact your doctor if: 
  · You do not get better as expected. Where can you learn more? Go to http://swapnil-otilia.info/. Enter D202 in the search box to learn more about \"Toenail Fungus: Care Instructions. \" Current as of: October 5, 2017 Content Version: 11.7 © 8659-7336 [employer]Novo Sciences. Care instructions adapted under license by Tablo (which disclaims liability or warranty for this information). If you have questions about a medical condition or this instruction, always ask your healthcare professional. [address] any warranty or liability for your use of this information. Introducing [facility] & HEALTH SERVICES! Dear Bruce Ludwig: 
Thank you for requesting a eCareDiary account. Our records indicate that you already have an active eCareDiary account. You can access your account anytime at [email or web address] Did you know that you can access your hospital and ER discharge instructions at any time in eCareDiary? You can also review all of your test results from your hospital stay or ER visit. Additional Information If you have questions, please visit the Frequently Asked Questions section of the eCareDiary website at [email or web address] Remember, eCareDiary is NOT to be used for urgent needs. For medical emergencies, dial 911. Now available from your iPhone and Android! Please provide this summary of care documentation to your next provider. Your primary care clinician is listed as Last [facility]ell. If you have any questions after today's visit, please call [phone].

## 2018-09-07 NOTE — PROGRESS NOTES
Chief Complaint   Patient presents with    Complete Physical    Other     wants emphasis on heart as he had an episode of chest tightness, going to the ER (July 2018- report printed) with normal EKG

## 2018-09-07 NOTE — PROGRESS NOTES
HISTORY OF PRESENT ILLNESS  Mickie Manning is a 47 y.o. male. HPI: here for physical and also Er visit follow up. Review ER visit records from care everywhere. He had sudden onset of chest pain over left side while sleeping and also felt discomfort in the chest with some breathing discomfort. Went to ER. Noted labs wnl including troponin, cbc and cmp. Has offered stress test but had to catch a flight so did not do the test before Er discharge. Pain lasted for 15 minutes. No re occurance of pain since then. No cough or cold. No fever. Denies any headache, dizziness, no chest pain at this time or trouble breathing, no arm or leg weakness. No nausea or vomiting, no weight or appetite changes, no depression or anxiety. No urine or bowel complains, no palpitation, no diaphoresis. No abdominal pain. No trouble sleeping. Visit Vitals    BP (!) 130/94 (BP 1 Location: Left arm, BP Patient Position: Sitting)    Pulse 71    Temp 98.7 °F (37.1 °C) (Oral)    Resp 16    Ht 6' (1.829 m)    Wt 251 lb (113.9 kg)    SpO2 98%    BMI 34.04 kg/m2     Noted mild elevated diastolic blood pressure. Never had high blood pressure . Not on any medication. Mentioned that he dose travel a lot . Also eating outside. Not sure might be high sodium diet. discussed high BMI. Discussed diet modification, calorie count and exercise. Discussed importance of weight loss. agree to do exercise and life style modification. Diet and exercise hand out given in AVS.   Also c/o dysphagia. Has also h/o dysphagia in the past . Had esophageal dilatation twice in the past. Also some GERD symptoms on and off. Asymptomatic at this time but mentioned it is gradually worsening. No appetite or weight changes. Also due for colonoscopy. Done rectal exam and prostate exam today. See other note. Also c/o onychomycosis. No itching . Discoloration of toe nails and thick brittle toe nails. Over all foot skin dry.    Patch of rash over rt mid leg. Since long time. Few months or more than that. No open skin. No itching or pain. No rash anywhere else. ? Psoriasis vs eczema. H/o hyperlipidemia. Not on statin as could not tolerate it as felt body ache and fatigue. ROS: see HPI     Physical Exam   Constitutional: He is oriented to person, place, and time. No distress. Cardiovascular: Normal heart sounds. Pulmonary/Chest: No respiratory distress. He has no wheezes. Abdominal: Soft. There is no tenderness. Musculoskeletal: He exhibits no edema. Neurological: He is oriented to person, place, and time. Skin:   Brittle thick , discolored toe nails. No open skin. Over all foot skin is dry. Localized patch of rash over rt mid leg. Erythematous. No itching or tenderness. No open skin. No discharge. ? Psoriasis vs eczema. Psychiatric: His behavior is normal.       ASSESSMENT and PLAN  1. Chest pain, unspecified type: one episode which took him to ER. EKG no acute changes. No elevated troponin. As due to work related travel was scheduled and had flight to [place] did not stay for stress test and decided to do as an out pt. For now sending to cardiology and start aspirin daily. Further recommendations per cardiology. R07.9 786.50 REFERRAL TO CARDIOLOGY   2. Hyperlipidemia, unspecified hyperlipidemia type: not on any medication as poor tolerance. For now diet modification, exercise and weight loss discussed. F/u after result. E78.5 272.4 LIPID PANEL      REFERRAL TO CARDIOLOGY   3. BMI 34.0-34.9,adult: discussed high BMI. Discussed diet modification, calorie count and exercise. Discussed importance of weight loss. agree to do exercise and life style modification. Diet and exercise hand out given in AVS.    Z68.34 V85.34    4. Onychomycosis: lamisil otc. B35.1 110.1    5. Rash: localized. Psoriasis vs eczema. Over rt mid leg. For now advised otc hydrocortisone as needed. R21 782.1    6. Elevated blood pressure reading: eating out a lot. Advised low salt diet f./u next visit. R03.0 796.2    7. Other dysphagia: reoccurring. Gradually getting worse. Will send again with GI. Has prior h/o esophageal dilatation. R13.19 787.29 REFERRAL TO GASTROENTEROLOGY      CANCELED: REFERRAL TO GASTROENTEROLOGY   Pt understood and agree with the plan   Review HM   Follow-up Disposition:  Return in about 3 months (around 12/7/2018).

## 2018-09-08 LAB
CHOLEST SERPL-MCNC: 251 MG/DL (ref 100–199)
EST. AVERAGE GLUCOSE BLD GHB EST-MCNC: 111 MG/DL
HBA1C MFR BLD: 5.5 % (ref 4.8–5.6)
HDLC SERPL-MCNC: 41 MG/DL
INTERPRETATION, 910389: NORMAL
LDLC SERPL CALC-MCNC: 179 MG/DL (ref 0–99)
TRIGL SERPL-MCNC: 153 MG/DL (ref 0–149)
VLDLC SERPL CALC-MCNC: 31 MG/DL (ref 5–40)

## 2018-09-10 NOTE — PROGRESS NOTES
Let pt know that elevated total cholesterol and LDL. Along with diet modification can consider low dose of medication as well. Ask what he thinks. Send diet info as well and otc fish oil 4 gm / day.

## 2018-09-14 NOTE — PROGRESS NOTES
Spoke with patient (all identifiers verified) to advise elevated total cholesterol and LDL. Along with diet modification can consider low dose of medication, as well. Patient verbalized understanding and stated he is not opposed to starting medication. However, he mentioned he had previously taken statins which caused bodyache/joint pain, and wonders if there is an alternative medication that will not cause these symptoms. He was advised I will route message to Dr. Alexandre Youngblood and will return call to him next week with her recommendation; he is aware she is out of the office until Monday. Patient was informed I will mail diet info and to start otc fish oil 4 gm / day. He verbalized understanding.

## 2018-09-17 NOTE — PROGRESS NOTES
Please let pt know that will discuss medication on next visit as he had some side effects in the past. For now start diet modification and fish oil. Thanks.

## 2018-09-19 ENCOUNTER — TELEPHONE (OUTPATIENT)
Dept: CARDIOLOGY CLINIC | Age: 53
End: 2018-09-19

## 2018-09-19 NOTE — TELEPHONE ENCOUNTER
Left message for patient to call office to schedule NP appt w/Dr. Yessy Perez for Chest pain, unspecified type  Hyperlipidemia, unspecified hyperlipidemia type

## 2018-09-27 NOTE — PROGRESS NOTES
Contacted patient and verified identity using name and date of birth (2- identifiers)  Spoke with patient and he verbalized understanding that this will be discussed at his next visit. Diet and fish oil.

## 2018-10-03 ENCOUNTER — OFFICE VISIT (OUTPATIENT)
Dept: CARDIOLOGY CLINIC | Age: 53
End: 2018-10-03

## 2018-10-03 VITALS
HEIGHT: 72 IN | BODY MASS INDEX: 34.67 KG/M2 | OXYGEN SATURATION: 98 % | HEART RATE: 98 BPM | WEIGHT: 256 LBS | SYSTOLIC BLOOD PRESSURE: 118 MMHG | DIASTOLIC BLOOD PRESSURE: 82 MMHG

## 2018-10-03 DIAGNOSIS — R07.9 CHEST PAIN, UNSPECIFIED TYPE: Primary | ICD-10-CM

## 2018-10-03 DIAGNOSIS — R00.2 PALPITATIONS: ICD-10-CM

## 2018-10-03 RX ORDER — ATORVASTATIN CALCIUM 40 MG/1
40 TABLET, FILM COATED ORAL DAILY
Qty: 30 TAB | Refills: 6 | Status: SHIPPED | OUTPATIENT
Start: 2018-10-03 | End: 2018-10-04 | Stop reason: SDUPTHER

## 2018-10-03 NOTE — MR AVS SNAPSHOT
[address] [facility] [address] 
[phone] Patient: Taz Hernandez MRN: [id number] :1965 Visit Information Date & Time Provider Department Dept. Phone Encounter #  
 10/3/2018 11:00 AM Delonte Mcdowell DO [facility] [id number] Follow-up Instructions Return in about 4 weeks (around 10/31/2018). Your Appointments 10/31/2018 10:00 AM  
Follow Up with Delonte Mcdowell DO [facility] ([address]) Appt Note: 4 wk f/u  
 Jair Tracey [id number] [phone] [place] [phone]  
  
    
 2018  9:30 AM  
FOLLOW UP EXAM with Bassam Gresham MD  
[facility] ([address]) Appt Note: 3 mth f/u  
 [address] [facility] [address] [facility]  
[address] [facility] [address] [facility] Upcoming Health Maintenance Date Due COLONOSCOPY 1984 Shingrix Vaccine Age 50> (1 of 2) 2016 Influenza Age 5 to Adult 2018 DTaP/Tdap/Td series (3 - Td) 2028 Allergies as of 10/3/2018  Review Complete On: 10/3/2018 By: Delonte Mcdowell DO Severity Noted Reaction Type Reactions Pcn [Penicillins]  2011    Unknown (comments) Statins-hmg-coa Reductase Inhibitors  2018    Other (comments) Generalize fatigue and body ache. Probably taken simvastatin. Current Immunizations  Reviewed on 2012 Name Date Influenza Vaccine Split 2009 TB Skin Test (PPD) Intradermal 2013 11:28 AM  
 TDAP Vaccine 2006 Tdap 2018 Not reviewed this visit You Were Diagnosed With   
  
 Codes Comments Palpitations    -  Primary ICD-10-CM: R00.2 ICD-9-CM: 785.1 Vitals BP Pulse Height(growth percentile) Weight(growth percentile) SpO2 BMI  
 118/82 98 6' (1.829 m) 256 lb (116.1 kg) 98% 34.72 kg/m2 Smoking Status Never Smoker Vitals History BMI and BSA Data Body Mass Index Body Surface Area 34.72 kg/m 2 2.43 m 2 Preferred Pharmacy Pharmacy Name Phone [address] [facility] [phone] Your Updated Medication List  
  
   
This list is accurate as of 10/3/18 12:06 PM.  Always use your most recent med list.  
  
  
  
  
 atorvastatin 40 mg tablet Commonly known as:  LIPITOR Take 1 Tab by mouth daily. ZyrTEC 10 mg tablet Generic drug:  cetirizine Take  by mouth. Prescriptions Printed Refills  
 atorvastatin (LIPITOR) 40 mg tablet 6 Sig: Take 1 Tab by mouth daily. Class: Print Route: Oral  
  
We Performed the Following AMB POC EKG ROUTINE W/ 12 LEADS, INTER & REP [31733 CPT(R)] Follow-up Instructions Return in about 4 weeks (around 10/31/2018). To-Do List   
 10/03/2018 ECHO:  ECHO EXERCISE STRESS Introducing [facility] & HEALTH SERVICES! Dear Carolyn Heading: 
Thank you for requesting a [email or web address] account. Our records indicate that you already have an active [email or web address] account. You can access your account anytime at [email or web address] Did you know that you can access your hospital and ER discharge instructions at any time in [email or web address]? You can also review all of your test results from your hospital stay or ER visit. Additional Information If you have questions, please visit the Frequently Asked Questions section of the [email or web address] website at [email or web address] Remember, [email or web address] is NOT to be used for urgent needs. For medical emergencies, dial 911. Now available from your iPhone and Android! Please provide this summary of care documentation to your next provider. Your primary care clinician is listed as Harry Cline. If you have any questions after today's visit, please call [phone].

## 2018-10-03 NOTE — PROGRESS NOTES
Jose F Cordero    Chief Complaint   Patient presents with    New Patient     referred for hyperlipidemia        HPI    Jose F Cordero is a 47 y.o. male with no known cardiac disease who presents for dyslipidemia and chest pain. As you know, Dora Nix is a very pleasant otherwise healthy gentleman who presented to the ED ([address] [facility]) after experiencing somewhat sudden onset substernal chest pressure. He was laying down at night and noted the discomfort, it lasted about 15 minutes and aborted spontaneously. He denies radiation or other associated symptoms such as palpitations, shortness of breath, dizziness or diaphoresis. He admits to having at least a few episodes of pain in the past but this one felt the most severe/ unrelenting. He used to be quite healthy and active but does admit due to travelling, he doesn't eat well and does not exercise. Just walking/ etc he does not notice exertional symptoms. He has a long history of hyperlipidemia, and tells me his LDL was even 190-200s when he was in the best shape of his life/ in his 35s. He has tried a few different statins over the years but stopped because he didn't like how they made him feel. Specifically, no indication of severe rhabdo- rather vague muscle fatigue and cramping in his legs. It should be noted he has a positive FH of premature CAD in his father who had his first heart attack at approx age 62, subsequently had a stroke and ultimately  of complications/ heart failure. His mother  at age 76 of pancreatic cancer. He has no siblings, but has 3 children. He has never smoked but was exposed to some second hand smoke from his father. He has not had repeated chest discomfort since his ED visit. He takes no daily medications, but does tell me he has dysphagia requiring esophageal dilation. He does not have DM2 by HbA1C 5.5. He became tearful during our encounter as he \"doesn't want to end up like his dad. \"    Past Medical History:   Diagnosis Date    Hypercholesterolemia      Patient Active Problem List   Diagnosis Code    Dysphagia R13.10    Family history of ischemic heart disease Z82.49    Hyperlipidemia E78.5    Onychomycosis B35.1     Past Surgical History:   Procedure Laterality Date    HX ORTHOPAEDIC      ankle, arm    HX WISDOM TEETH EXTRACTION       Current Outpatient Prescriptions   Medication Sig Dispense Refill    atorvastatin (LIPITOR) 40 mg tablet Take 1 Tab by mouth daily. 30 Tab 6    cetirizine (ZYRTEC) 10 mg tablet Take  by mouth. Allergies   Allergen Reactions    Pcn [Penicillins] Unknown (comments)    Statins-Hmg-Coa Reductase Inhibitors Other (comments)     Generalize fatigue and body ache. Probably taken simvastatin. Family History   Problem Relation Age of Onset    Diabetes Father     Heart Attack Father 61    Stroke Father 64    Hypertension Father    Lightning.Reus Arthritis-rheumatoid Mother     Dementia Maternal Grandmother     Heart Disease Maternal Grandfather 54     Self employed    Review of Systems    Review of Systems negative unless otherwise mentioned in the HPI. Physical Exam:    Visit Vitals    /82    Pulse 98    Ht 6' (1.829 m)    Wt 116.1 kg (256 lb)    SpO2 98%    BMI 34.72 kg/m2      Physical Exam   Constitutional: He is oriented to person, place, and time. He appears well-developed and well-nourished. No distress. HENT:   Head: Atraumatic. Eyes: EOM are normal. Pupils are equal, round, and reactive to light. No scleral icterus. Neck: No JVD present. No thyromegaly present. Cardiovascular: Normal rate, regular rhythm, normal heart sounds and intact distal pulses. Exam reveals no gallop and no friction rub. No murmur heard. Pulmonary/Chest: Breath sounds normal.   Abdominal: Soft. Musculoskeletal: He exhibits no edema. Neurological: He is alert and oriented to person, place, and time. Skin: Skin is warm and dry. No rash noted.  He is not diaphoretic. No erythema. No pallor. No xanthomas noted   Psychiatric: He has a normal mood and affect. His behavior is normal.     EKG sinus tachycardia 100, possible leftward axis, normal intervals, no significant ST changes concerning for ischemia    Last  (2/9/2018)    Impression and Plan:  Suzanne Reese is a 47 y.o. male with:    1.) Chest pain, persistent, somewhat atypical/ nonexertional  2.) Heterozygous familial hyperlipidemia, formal diagnosis based on LDL >190 + FH  3.) Esophageal symptoms r/o GERD as cause of #1    1.) Stress echocardiogram due to risk of premature CAD with symptoms  2.) Atorvastatin 40 qhs (high intensity); goal is at least 50% reduction in LDL; would recheck lipid profile in 3-6 months after therapy started. Deferred further diet/ exercise counseling to next visit after #1 completed    I took the liberty of setting patient up with further noninvasive stress testing because of his higher risk for premature CAD, in the setting of heterozygous FH. I spent considerable time explaining this diagnosis and the importance of giving statins another try before truly documenting \"failure\". He will go to alternate day dosing and call me if symptoms of leg cramping become too much, but we hope these will subside after a few weeks of daily use. He admits to stopping soon after starting various statins in the past, so is willing to give this one a try. I will recheck him routinely in 4-6 weeks but asked him to call sooner if problems arise. Thank you for allowing me to participate in the care of your patient, please do not hesitate to call with questions or concerns. Follow-up Disposition:  Return in about 4 weeks (around 10/31/2018).     Derek Must, DO

## 2018-10-04 RX ORDER — ATORVASTATIN CALCIUM 40 MG/1
40 TABLET, FILM COATED ORAL DAILY
Qty: 30 TAB | Refills: 6 | OUTPATIENT
Start: 2018-10-04

## 2018-10-04 RX ORDER — ATORVASTATIN CALCIUM 40 MG/1
40 TABLET, FILM COATED ORAL DAILY
Qty: 30 TAB | Refills: 6 | Status: SHIPPED | OUTPATIENT
Start: 2018-10-04 | End: 2019-05-15 | Stop reason: SDUPTHER

## 2018-11-01 ENCOUNTER — HOSPITAL ENCOUNTER (OUTPATIENT)
Dept: NON INVASIVE DIAGNOSTICS | Age: 53
Discharge: HOME | End: 2018-11-01
Attending: INTERNAL MEDICINE
Payer: COMMERCIAL

## 2018-11-01 VITALS
SYSTOLIC BLOOD PRESSURE: 140 MMHG | HEIGHT: 72 IN | DIASTOLIC BLOOD PRESSURE: 88 MMHG | BODY MASS INDEX: 33.86 KG/M2 | WEIGHT: 250 LBS

## 2018-11-01 DIAGNOSIS — R07.9 CHEST PAIN, UNSPECIFIED TYPE: ICD-10-CM

## 2018-11-01 LAB
STRESS ANGINA INDEX: 0
STRESS BASELINE DIAS BP: 88 MMHG
STRESS BASELINE HR: 64 BPM
STRESS BASELINE SYS BP: 140 MMHG
STRESS ESTIMATED WORKLOAD: 12 METS
STRESS EXERCISE DUR MIN: NORMAL
STRESS PEAK DIAS BP: 80 MMHG
STRESS PEAK SYS BP: 184 MMHG
STRESS PERCENT HR ACHIEVED: 115 %
STRESS POST PEAK HR: 164 BPM
STRESS RATE PRESSURE PRODUCT: NORMAL BPM*MMHG
STRESS SR DUKE TREADMILL SCORE: 0
STRESS ST DEPRESSION: 0 MM
STRESS ST ELEVATION: 0 MM
STRESS STAGE 1 BP: NORMAL MMHG
STRESS STAGE 1 DURATION: 3 MIN:SEC
STRESS STAGE 1 HR: 108 BPM
STRESS STAGE 2 BP: NORMAL MMHG
STRESS STAGE 2 DURATION: 3 MIN:SEC
STRESS STAGE 2 HR: 133 BPM
STRESS STAGE 3 BP: NORMAL MMHG
STRESS STAGE 3 DURATION: 3 MIN:SEC
STRESS STAGE 3 HR: 157 BPM
STRESS STAGE 4 DURATION: NORMAL MIN:SEC
STRESS STAGE 4 HR: 164 BPM
STRESS STAGE RECOVERY 1 BP: NORMAL MMHG
STRESS STAGE RECOVERY 1 DURATION: 2 MIN:SEC
STRESS STAGE RECOVERY 1 HR: 108 BPM
STRESS STAGE RECOVERY 2 BP: NORMAL MMHG
STRESS STAGE RECOVERY 2 DURATION: 4 MIN:SEC
STRESS STAGE RECOVERY 2 HR: 105 BPM
STRESS TARGET HR: 143 BPM

## 2018-11-01 PROCEDURE — 93351 STRESS TTE COMPLETE: CPT

## 2018-11-02 NOTE — PROGRESS NOTES
Per your last note\" Elizabeth Bartlett is a 47 y.o. male with:     1.) Chest pain, persistent, somewhat atypical/ nonexertional  2.) Heterozygous familial hyperlipidemia, formal diagnosis based on LDL >190 + FH  3.) Esophageal symptoms r/o GERD as cause of #1     1.) Stress echocardiogram due to risk of premature CAD with symptoms  2.) Atorvastatin 40 qhs (high intensity); goal is at least 50% reduction in LDL; would recheck lipid profile in 3-6 months after therapy started. Deferred further diet/ exercise counseling to next visit after #1 completed     I took the liberty of setting patient up with further noninvasive stress testing because of his higher risk for premature CAD, in the setting of heterozygous FH. I spent considerable time explaining this diagnosis and the importance of giving statins another try before truly documenting \"failure\". He will go to alternate day dosing and call me if symptoms of leg cramping become too much, but we hope these will subside after a few weeks of daily use. He admits to stopping soon after starting various statins in the past, so is willing to give this one a try. I will recheck him routinely in 4-6 weeks but asked him to call sooner if problems arise. Thank you for allowing me to participate in the care of your patient, please do not hesitate to call with questions or concerns.

## 2018-11-05 ENCOUNTER — TELEPHONE (OUTPATIENT)
Dept: CARDIOLOGY CLINIC | Age: 53
End: 2018-11-05

## 2018-11-05 NOTE — TELEPHONE ENCOUNTER
----- Message from Jon Kunz DO sent at 11/2/2018 10:59 PM EDT -----  Please call him and let him know stress test was normal. How is he tolerating cholesterol medication I started? I will discuss with him further when I see him in a couple weeks. Thanks    ----- Message -----  From: Meghan Murphy LPN  Sent: 94/7/1301   8:10 AM  To: Jon Kunz DO    Per your last note\" Servando Bustillo is a 47 y.o. male with:     1.) Chest pain, persistent, somewhat atypical/ nonexertional  2.) Heterozygous familial hyperlipidemia, formal diagnosis based on LDL >190 + FH  3.) Esophageal symptoms r/o GERD as cause of #1     1.) Stress echocardiogram due to risk of premature CAD with symptoms  2.) Atorvastatin 40 qhs (high intensity); goal is at least 50% reduction in LDL; would recheck lipid profile in 3-6 months after therapy started. Deferred further diet/ exercise counseling to next visit after #1 completed     I took the liberty of setting patient up with further noninvasive stress testing because of his higher risk for premature CAD, in the setting of heterozygous FH. I spent considerable time explaining this diagnosis and the importance of giving statins another try before truly documenting \"failure\". He will go to alternate day dosing and call me if symptoms of leg cramping become too much, but we hope these will subside after a few weeks of daily use. He admits to stopping soon after starting various statins in the past, so is willing to give this one a try. I will recheck him routinely in 4-6 weeks but asked him to call sooner if problems arise.   Thank you for allowing me to participate in the care of your patient, please do not hesitate to call with questions or concerns.

## 2018-11-05 NOTE — TELEPHONE ENCOUNTER
This has been fully explained to the patient, who indicates understanding. Patient stated he has be doing well on the cholesterol medication, he has not experience any side effects.

## 2018-11-14 ENCOUNTER — OFFICE VISIT (OUTPATIENT)
Dept: CARDIOLOGY CLINIC | Age: 53
End: 2018-11-14

## 2018-11-14 VITALS
BODY MASS INDEX: 34.81 KG/M2 | HEART RATE: 60 BPM | WEIGHT: 257 LBS | OXYGEN SATURATION: 99 % | DIASTOLIC BLOOD PRESSURE: 94 MMHG | HEIGHT: 72 IN | SYSTOLIC BLOOD PRESSURE: 140 MMHG

## 2018-11-14 DIAGNOSIS — E78.5 HYPERLIPIDEMIA, UNSPECIFIED HYPERLIPIDEMIA TYPE: Primary | ICD-10-CM

## 2018-11-14 NOTE — PROGRESS NOTES
Eliezer Velásquez    Chief Complaint   Patient presents with    Follow-up     4 week - no cardiac complaints     HPI    Eliezer Velásquez is a 47 y.o. male with no known cardiac disease who initially presented for dyslipidemia and chest pain. As you know, Galina Olmstead is a very pleasant otherwise healthy gentleman who presented to the ED ([facility]) after experiencing somewhat sudden onset substernal chest pressure. He was laying down at night and noted the discomfort, it lasted about 15 minutes and aborted spontaneously. He denies radiation or other associated symptoms such as palpitations, shortness of breath, dizziness or diaphoresis. He admits to having at least a few episodes of pain in the past but this one felt the most severe/ unrelenting. He used to be quite healthy and active but does admit due to travelling, he doesn't eat well and does not exercise. Just walking/ etc he does not notice exertional symptoms. He has a long history of hyperlipidemia, and tells me his LDL was even 190-200s when he was in the best shape of his life/ in his 35s. He has tried a few different statins over the years but stopped because he didn't like how they made him feel. Specifically, no indication of severe rhabdo- rather vague muscle fatigue and cramping in his legs. It should be noted he has a positive FH of premature CAD in his father who had his first heart attack at approx age 62, subsequently had a stroke and ultimately  of complications/ heart failure. His mother  at age 76 of pancreatic cancer. He has no siblings, but has 3 children. He has never smoked but was exposed to some second hand smoke from his father. He has been doing well on the atorvastatin I started last month. No intolerances noticed. He has had some chest discomfort. He notices it mostly at night when laying down. He is supposed to be on PPI BID but hasnt gotten the Rx yet. He has no exertional symptoms.       Past Medical History: Diagnosis Date    Hypercholesterolemia      Patient Active Problem List   Diagnosis Code    Dysphagia R13.10    Family history of ischemic heart disease Z82.49    Hyperlipidemia E78.5    Onychomycosis B35.1     Past Surgical History:   Procedure Laterality Date    HX ORTHOPAEDIC      ankle, arm    HX WISDOM TEETH EXTRACTION       Current Outpatient Medications   Medication Sig Dispense Refill    atorvastatin (LIPITOR) 40 mg tablet Take 1 Tab by mouth daily. 30 Tab 6    cetirizine (ZYRTEC) 10 mg tablet Take  by mouth. Allergies   Allergen Reactions    Pcn [Penicillins] Unknown (comments)    Statins-Hmg-Coa Reductase Inhibitors Other (comments)     Generalize fatigue and body ache. Probably taken simvastatin. Family History   Problem Relation Age of Onset    Diabetes Father     Heart Attack Father 61    Stroke Father 64    Hypertension Father    [facility] Arthritis-rheumatoid Mother     Dementia Maternal Grandmother     Heart Disease Maternal Grandfather 54     Self employed    Review of Systems    Review of Systems negative unless otherwise mentioned in the HPI. Physical Exam:    Visit Vitals  BP (!) 140/94   Pulse 60   Ht 6' (1.829 m)   Wt 116.6 kg (257 lb)   SpO2 99%   BMI 34.86 kg/m²      Physical Exam   Constitutional: He is oriented to person, place, and time. He appears well-developed and well-nourished. No distress. HENT:   Head: Atraumatic. Eyes: EOM are normal. Pupils are equal, round, and reactive to light. No scleral icterus. Neck: No JVD present. No thyromegaly present. Cardiovascular: Normal rate, regular rhythm, normal heart sounds and intact distal pulses. Exam reveals no gallop and no friction rub. No murmur heard. Pulmonary/Chest: Breath sounds normal.   Abdominal: Soft. Musculoskeletal: He exhibits no edema. Neurological: He is alert and oriented to person, place, and time. Skin: Skin is warm and dry. No rash noted. He is not diaphoretic. No erythema.  No pallor. No xanthomas noted   Psychiatric: He has a normal mood and affect. His behavior is normal.     Last  (9/7/2018)    11/01/18   ECHO STRESS 11/01/2018 11/1/2018    Narrative · Stress echocardiogram is normal. Low risk study. · Baseline ECG: Normal sinus rhythm. · Negative stress electrocardiogram.        Signed by: Jessee Phillips MD       Impression and Plan:  Gnozales Smith is a 47 y.o. male with:    1.) Atypical chest pain, likely esophageal etiology  2.) Heterozygous familial hyperlipidemia    1.) Discussed negative stress test results at office today  2.) Continue Atorvastatin 40 qhs (high intensity); goal is at least 50% reduction in LDL  3.) Recheck FLP 3-6 months from initial therapy and RTC after for recheck    Thank you for allowing me to participate in the care of your patient, please do not hesitate to call with questions or concerns. Follow-up Disposition:  Return in about 3 months (around 2/14/2019).     Yanira Singh DO

## 2018-11-14 NOTE — PROGRESS NOTES
Jacey Leiva presents today for   Chief Complaint   Patient presents with    Follow-up     4 week - no cardiac complaints       Jacey Leiva preferred language for health care discussion is english/other. Is someone accompanying this pt? No    Is the patient using any DME equipment during OV? No    Depression Screening:  PHQ over the last two weeks 9/7/2018   Little interest or pleasure in doing things Not at all   Feeling down, depressed, irritable, or hopeless Not at all   Total Score PHQ 2 0       Learning Assessment:  Learning Assessment 1/20/2014   PRIMARY LEARNER Patient   HIGHEST LEVEL OF EDUCATION - PRIMARY LEARNER  4 YEARS OF COLLEGE   BARRIERS PRIMARY LEARNER NONE   CO-LEARNER CAREGIVER No   PRIMARY LANGUAGE ENGLISH   LEARNER PREFERENCE PRIMARY OTHER (COMMENT)   ANSWERED BY Patient   RELATIONSHIP SELF       Abuse Screening:  Abuse Screening Questionnaire 9/7/2018   Do you ever feel afraid of your partner? N   Are you in a relationship with someone who physically or mentally threatens you? N   Is it safe for you to go home? Y       Fall Risk  No flowsheet data found. Pt currently taking Antiplatelet therapy? No    Coordination of Care:  1. Have you been to the ER, urgent care clinic since your last visit? Hospitalized since your last visit? No    2. Have you seen or consulted any other health care providers outside of the [address] [place] [address] since your last visit? Include any pap smears or colon screening.  No

## 2018-12-07 ENCOUNTER — OFFICE VISIT (OUTPATIENT)
Dept: FAMILY MEDICINE CLINIC | Age: 53
End: 2018-12-07

## 2018-12-07 VITALS
DIASTOLIC BLOOD PRESSURE: 78 MMHG | SYSTOLIC BLOOD PRESSURE: 124 MMHG | TEMPERATURE: 98.1 F | WEIGHT: 259.6 LBS | HEART RATE: 71 BPM | RESPIRATION RATE: 16 BRPM | BODY MASS INDEX: 35.16 KG/M2 | HEIGHT: 72 IN | OXYGEN SATURATION: 100 %

## 2018-12-07 DIAGNOSIS — R13.10 DYSPHAGIA, UNSPECIFIED TYPE: Primary | ICD-10-CM

## 2018-12-07 DIAGNOSIS — E78.5 HYPERLIPIDEMIA, UNSPECIFIED HYPERLIPIDEMIA TYPE: ICD-10-CM

## 2018-12-07 DIAGNOSIS — R07.9 CHEST PAIN, UNSPECIFIED TYPE: ICD-10-CM

## 2018-12-07 DIAGNOSIS — R03.0 ELEVATED BLOOD PRESSURE READING: ICD-10-CM

## 2018-12-07 DIAGNOSIS — Z23 ENCOUNTER FOR IMMUNIZATION: ICD-10-CM

## 2018-12-07 PROBLEM — E66.01 SEVERE OBESITY (HCC): Status: ACTIVE | Noted: 2018-12-07

## 2018-12-07 RX ORDER — OMEPRAZOLE 40 MG/1
1 CAPSULE, DELAYED RELEASE ORAL DAILY
COMMUNITY
Start: 2018-11-15

## 2018-12-07 NOTE — PATIENT INSTRUCTIONS
Learning About Low-Fat Eating  What is low-fat eating? Most food has some fat in it. Your body needs some fat to be healthy. But some kinds of fats are healthier than others. In a low-fat eating plan, you try to choose healthier fats and eat fewer unhealthy fats. Healthy fats include olive and canola oil. Try to avoid eating too much saturated fat (such as in cheese and meats) and trans fat (a type of fat found in many packaged snack foods and other baked goods). You do not need to cut all fat from your diet. But you can make healthier choices about the types and amount of fat you eat. Even though it is a good idea to choose healthier fats, it is still important to be careful of how much fat you eat, because all fats are high in calories. What are the different types of fats? Unhealthy fats  · Saturated fat. Saturated fats are mostly in animal foods, such as meat and dairy foods. Tropical oils, such as coconut oil, palm oil, and cocoa butter, are also saturated fats. · Trans fat. Trans fats include shortening, partially hydrogenated vegetable oils, and hydrogenated vegetable oils. Trans fats are made when a liquid fat is turned into a solid fat (for example, when corn oil is made into stick margarine). They are in many processed foods, such as cookies, crackers, and snack foods. Healthy fats  · Monounsaturated fat. Monounsaturated fats are liquid at room temperature but get solid when refrigerated. Eating foods that are high in this fat may help lower your \"bad\" (LDL) cholesterol, keep your \"good\" (HDL) cholesterol level up, and lower your chances of getting coronary artery disease. This fat is found in canola oil, olive oil, peanut oil, olives, avocados, nuts, and nut butters. · Polyunsaturated fat. Polyunsaturated fats are liquid at room temperature. They are in safflower, sunflower, and corn oils. They are also the main fat in seafood. Omega-3 fatty acids are types of polyunsaturated fat.  Eating fish may lower your chances of getting coronary artery disease. Fatty fish such as salmon and mackerel contain these healthy fatty acids. So do ground flaxseeds and flaxseed oil, soybeans, walnuts, and seeds. Why cut down on unhealthy fats? Eating foods that contain saturated fats can raise the LDL (\"bad\") cholesterol in your blood. Having a high level of LDL cholesterol increases your chance of hardening of the arteries (atherosclerosis), which can lead to heart disease, heart attack, and stroke. Trans fat raises the level of \"bad\" LDL cholesterol in your blood and may lower the \"good\" HDL cholesterol in your blood. Trans fat can raise your risk of heart disease, heart attack, and stroke. In general:  · No more than 10% of your daily calories should come from saturated fat. This is about 20 grams in a 2,000-calorie diet. · No more than 10% of your daily calories should come from polyunsaturated fat. This is about 20 grams in a 2,000-calorie diet. · Monounsaturated fats can be up to 15% of your daily calories. This is about 25 to 30 grams in a 2,000-calorie diet. If you're not sure how much fat you should be eating or how many calories you need each day to stay at a healthy weight, talk to a registered dietitian. He or she can help you create a plan that's right for you. What can you do to cut down on fat? Foods like cheese, butter, sausage, and desserts can have a lot of unhealthy fats. Try these tips for healthier meals at home and when you eat out. At home  · Fill up on fruits, vegetables, and whole grains. · Think of meat as a side dish instead of as the main part of your meal.  · When you do eat meat, make it extra-lean ground beef (97% lean), ground turkey breast (without skin added), meats with fat trimmed off before cooking, or skinless chicken. · Try main dishes that use whole wheat pasta, brown rice, dried beans, or vegetables.   · Use cooking methods that use little or no fat, such as broiling, steaming, or grilling. Use cooking spray instead of oil. If you use oil, use a monounsaturated oil, such as canola or olive oil. · Read food labels on canned, bottled, or packaged foods. Choose those with little saturated fat and no trans fat. When eating out at a restaurant  · Order foods that are broiled or poached instead of fried or breaded. · Cut back on the amount of butter or margarine that you use on bread. Use small amounts of olive oil instead. · Order sauces, gravies, and salad dressings on the side, and use only a little. · When you order pasta, choose tomato sauce instead of cream sauce. · Ask for salsa with your baked potato instead of sour cream, butter, cheese, or barrett. Where can you learn more? Go to http://swapnil-otilia.info/. Enter G407 in the search box to learn more about \"Learning About Low-Fat Eating. \"  Current as of: March 29, 2018  Content Version: 11.8  © 2554-9224 [employer]wise, [employer]. Care instructions adapted under license by [employer] (which disclaims liability or warranty for this information). If you have questions about a medical condition or this instruction, always ask your healthcare professional. [address] any warranty or liability for your use of this information.

## 2018-12-07 NOTE — PROGRESS NOTES
1. Have you been to the ER, urgent care clinic since your last visit? Hospitalized since your last visit? No    2. Have you seen or consulted any other health care providers outside of the [address] [place] [address] since your last visit? Include any pap smears or colon screening. GLST LOV: 11/07/18  for colonoscopy/endoscopy. Patient requests flu vaccine today.

## 2018-12-07 NOTE — PROGRESS NOTES
Patient presents for flu vaccine. Consent obtained, Tolerated procedure well at right deltoid. Patient remained in office 10 minutes post vaccine. No side effects noted.      Lot #  [id number]  exp 06/30/19  [address] [place] [address] [address]: [id number]

## 2018-12-07 NOTE — PROGRESS NOTES
HISTORY OF PRESENT ILLNESS  Orlean Members is a 47 y.o. male. HPI: here for follow up. Had cardiology evaluation for left side chest pain. Done stress test and no acute findings. No new recommendations from cardiology per their follow up notes. Reviewed them. He denies any episode of chest pain reoccurance. Had an elevated blood pressure and it has been stable. Asymptomatic. Visit Vitals  /78 (BP 1 Location: Left arm, BP Patient Position: Sitting)   Pulse 71   Temp 98.1 °F (36.7 °C) (Oral)   Resp 16   Ht 6' (1.829 m)   Wt 259 lb 9.6 oz (117.8 kg)   SpO2 100%   BMI 35.21 kg/m²     Also h/o dysphagia. Seen GI .had EGD done and esophageal dilatation. No more chocking. Also started PPI and it has been helping. No abdominal pain. No nausea or vomiting. No sore throat. No change in voice. Denies any headache, dizziness, no chest pain or trouble breathing, no arm or leg weakness. No nausea or vomiting, no weight or appetite changes, no depression or anxiety. No urine or bowel complains, no palpitation, no diaphoresis. H/o hyperlipidemia. On statin. Tolerating it well. No side effects. Will be followed by cardiology. Lab Results   Component Value Date/Time    Cholesterol, total 251 (H) 09/07/2018 12:30 PM    HDL Cholesterol 41 09/07/2018 12:30 PM    LDL, calculated 179 (H) 09/07/2018 12:30 PM    VLDL, calculated 31 09/07/2018 12:30 PM    Triglyceride 153 (H) 09/07/2018 12:30 PM   cbc and CMP reviewed by care everywhere. ROS: see HPI     Physical Exam   Constitutional: He is oriented to person, place, and time. No distress. Cardiovascular: Normal heart sounds. Pulmonary/Chest: No respiratory distress. He has no wheezes. Abdominal: Soft. There is no tenderness. Musculoskeletal: He exhibits no edema. Neurological: He is oriented to person, place, and time. Psychiatric: His behavior is normal.       ASSESSMENT and PLAN    ICD-10-CM ICD-9-CM    1.  Dysphagia, unspecified type R13.10 787.20     post dilatation, post EGD. following GI and taking PPI    2. Hyperlipidemia, unspecified hyperlipidemia type: on statin. No side effects. Working on diet modification and life style modification. E78.5 272.4    3. Chest pain, unspecified type:\" improved at this time. Probably GERD related. R07.9 786.50    4. Encounter for immunization Z23 V03.89 INFLUENZA VIRUS VAC QUAD,SPLIT,PRESV FREE SYRINGE IM   5. Elevated blood pressure: today stable. Not on any medication. Low salt diet. Will observe. Pt understood and agree with the plan   Review HM   Follow-up Disposition:  Return in about 4 months (around 4/7/2019).

## 2019-02-01 ENCOUNTER — HOSPITAL ENCOUNTER (OUTPATIENT)
Dept: LAB | Age: 54
Discharge: HOME | End: 2019-02-01

## 2019-02-01 LAB — XX-LABCORP SPECIMEN COL,LCBCF: NORMAL

## 2019-02-01 PROCEDURE — 99001 SPECIMEN HANDLING PT-LAB: CPT

## 2019-02-02 LAB
CHOLEST SERPL-MCNC: 152 MG/DL (ref 100–199)
HDLC SERPL-MCNC: 39 MG/DL
INTERPRETATION, 910389: NORMAL
LDLC SERPL CALC-MCNC: 92 MG/DL (ref 0–99)
SPECIMEN STATUS REPORT, ROLRST: NORMAL
TRIGL SERPL-MCNC: 104 MG/DL (ref 0–149)
VLDLC SERPL CALC-MCNC: 21 MG/DL (ref 5–40)

## 2019-02-04 NOTE — PROGRESS NOTES
Per your last note ' Impression and Plan:  Stefano Upton is a 47 y.o. male with:     1.) Atypical chest pain, likely esophageal etiology  2.) Heterozygous familial hyperlipidemia     1.) Discussed negative stress test results at office today  2.) Continue Atorvastatin 40 qhs (high intensity); goal is at least 50% reduction in LDL  3.) Recheck FLP 3-6 months from initial therapy and RTC after for recheck     Thank you for allowing me to participate in the care of your patient, please do not hesitate to call with questions or concerns.

## 2019-02-08 ENCOUNTER — OFFICE VISIT (OUTPATIENT)
Dept: CARDIOLOGY CLINIC | Age: 54
End: 2019-02-08

## 2019-02-08 VITALS
HEART RATE: 77 BPM | HEIGHT: 72 IN | BODY MASS INDEX: 35.49 KG/M2 | SYSTOLIC BLOOD PRESSURE: 144 MMHG | WEIGHT: 262 LBS | OXYGEN SATURATION: 98 % | DIASTOLIC BLOOD PRESSURE: 102 MMHG

## 2019-02-08 DIAGNOSIS — Z82.49 FAMILY HISTORY OF ISCHEMIC HEART DISEASE: ICD-10-CM

## 2019-02-08 DIAGNOSIS — E78.5 HYPERLIPIDEMIA, UNSPECIFIED HYPERLIPIDEMIA TYPE: Primary | ICD-10-CM

## 2019-02-08 NOTE — PROGRESS NOTES
Concepción Kirkland    Chief Complaint   Patient presents with    Follow-up     3 month follow up     HPI    Concepción Kirkland is a 47 y.o. male with no known cardiac disease who initially presented to me back in 2018 for dyslipidemia and chest pain. Atypical CP/ GI etiology:  As you know, JONNATHAN GALLARDO is a very pleasant gentleman who presented to the ED ([facility]) after experiencing somewhat sudden onset substernal chest pressure. He was laying down at night and noted the discomfort, it lasted about 15 minutes and aborted spontaneously. Based on his risk factors, I sent him for a stress test 2018 which was normal. He ended up seeing his GI- had an EGD with dilation, has been on PPIs and his discomfort has resolved. He has no current complaints. Hyperlipidemia:  He has a long history of hyperlipidemia, and tells me his LDL was even 190-200s when he was in the best shape of his life/ in his 35s. He has tried a few different statins over the years but stopped because he didn't like how they made him feel. Specifically, no indication of severe rhabdo- rather vague muscle fatigue and cramping in his legs. It should be noted he has a positive FH of premature CAD in his father who had his first heart attack at approx age 62, subsequently had a stroke and ultimately  of complications/ heart failure. His mother  at age 76 of pancreatic cancer. He has no siblings, but has 3 children. He has never smoked but was exposed to some second hand smoke from his father. I started him on high intensity statin- atorvastatin and checked in on him frequently. No intolerances noticed and we achieved reduction his LDL with in 3 months, which was reviewed with him today.     PreHTN/ Weight Gain:  He just got back to work since after the holidays (due to the gov shutdown) so admits he has gained about 50 lbs from eating- including adding salt etc. About 2 weeks ago, he started making better food choices, portion control, and is now working out 3-4x/ week. He has already lost ~5 lbs but wants to discuss weight loss goals. His SBP was elevated last few times checked, as noted below. Past Medical History:   Diagnosis Date    Hypercholesterolemia      Patient Active Problem List   Diagnosis Code    Dysphagia R13.10    Family history of ischemic heart disease Z82.49    Hyperlipidemia E78.5    Onychomycosis B35.1    Severe obesity ([facility] [address] E66.01     Past Surgical History:   Procedure Laterality Date    HX ORTHOPAEDIC      ankle, arm    HX WISDOM TEETH EXTRACTION       Current Outpatient Medications   Medication Sig Dispense Refill    omeprazole (PRILOSEC) 40 mg capsule Take 1 Cap by mouth daily.  atorvastatin (LIPITOR) 40 mg tablet Take 1 Tab by mouth daily. 30 Tab 6    cetirizine (ZYRTEC) 10 mg tablet Take 10 mg by mouth as needed. Allergies   Allergen Reactions    Pcn [Penicillins] Unknown (comments)    Statins-Hmg-Coa Reductase Inhibitors Other (comments)     Generalize fatigue and body ache. Probably taken simvastatin. Family History   Problem Relation Age of Onset    Diabetes Father     Heart Attack Father 61    Stroke Father 64    Hypertension Father    [facility] Arthritis-rheumatoid Mother     Dementia Maternal Grandmother     Heart Disease Maternal Grandfather 54     Self employed    Review of Systems    Review of Systems negative unless otherwise mentioned in the HPI. Physical Exam:    Visit Vitals  BP (!) 144/102   Pulse 77   Ht 6' (1.829 m)   Wt 118.8 kg (262 lb)   SpO2 98%   BMI 35.53 kg/m²      Physical Exam   Constitutional: He is oriented to person, place, and time. He appears well-developed and well-nourished. No distress. HENT:   Head: Atraumatic. Eyes: EOM are normal. Pupils are equal, round, and reactive to light. No scleral icterus. Neck: No JVD present. No thyromegaly present.    Cardiovascular: Normal rate, regular rhythm, normal heart sounds and intact distal pulses. Exam reveals no gallop and no friction rub. No murmur heard. Pulmonary/Chest: Breath sounds normal.   Abdominal: Soft. Musculoskeletal: He exhibits no edema. Neurological: He is alert and oriented to person, place, and time. Skin: Skin is warm and dry. No rash noted. He is not diaphoretic. No erythema. No pallor. No xanthomas noted   Psychiatric: He has a normal mood and affect. His behavior is normal.     Last  (9/7/2018)    Lab Results   Component Value Date/Time    Cholesterol, total 152 02/01/2019 12:00 AM    HDL Cholesterol 39 (L) 02/01/2019 12:00 AM    LDL, calculated 92 02/01/2019 12:00 AM    VLDL, calculated 21 02/01/2019 12:00 AM    Triglyceride 104 02/01/2019 12:00 AM       11/01/18   ECHO STRESS 11/01/2018 11/1/2018    Narrative · Stress echocardiogram is normal. Low risk study. · Baseline ECG: Normal sinus rhythm. · Negative stress electrocardiogram.        Signed by: Dolores Card MD       Impression and Plan:  Madeline Quezada is a 47 y.o. male with:    1.) Atypical chest pain, likely esophageal etiology- resolved post dilation + PPI  2.) Heterozygous familial hyperlipidemia, had great/ expected goal with HI statin, continue current care, our ultimate goal will be LDL <70 but agree can hold off on +Zetia + PCSK9 inhibitor  3.) PreHTN, I believe his SBP is creeping up due to his recent weight gain and is also likely to impace his LDL    1.) Continue Atorvastatin 40 qhs (high intensity)  2.) Recheck FLP + LFTs in 6 months.   3.) Aggressive risk factor modification discussed- includes low sodium/ low fat diet with routine aerobic exercise. Goal of at least 15-20 lbs at our next visit. Hopefully with weight loss/ diet modification- we can avoid escalating Jesse's medical therapy. I spent > 30 minutes counseling and asked him to call me sooner if questions/ problems. All his questions were answered.     Thank you for allowing me to participate in the care of your patient, please do not hesitate to call with questions or concerns. Follow-up Disposition:  Return in about 6 months (around 8/8/2019).     Atanacio Harada,

## 2019-04-05 ENCOUNTER — OFFICE VISIT (OUTPATIENT)
Dept: FAMILY MEDICINE CLINIC | Age: 54
End: 2019-04-05

## 2019-04-05 VITALS
BODY MASS INDEX: 34.95 KG/M2 | HEIGHT: 72 IN | RESPIRATION RATE: 16 BRPM | HEART RATE: 76 BPM | TEMPERATURE: 98.1 F | SYSTOLIC BLOOD PRESSURE: 128 MMHG | OXYGEN SATURATION: 99 % | DIASTOLIC BLOOD PRESSURE: 76 MMHG | WEIGHT: 258 LBS

## 2019-04-05 DIAGNOSIS — R13.10 DYSPHAGIA, UNSPECIFIED TYPE: ICD-10-CM

## 2019-04-05 DIAGNOSIS — E78.5 HYPERLIPIDEMIA, UNSPECIFIED HYPERLIPIDEMIA TYPE: Primary | ICD-10-CM

## 2019-04-05 NOTE — PROGRESS NOTES
1. Have you been to the ER, urgent care clinic since your last visit? Hospitalized since your last visit? No    2. Have you seen or consulted any other health care providers outside of the [address] [place] [address] since your last visit? Include any pap smears or colon screening.  No

## 2019-04-05 NOTE — PROGRESS NOTES
HISTORY OF PRESENT ILLNESS  Onel García is a 49 y.o. male. HPI: Here for follow up on hyperlipidemia. Taking statin with compliance. Denies any side effects of fatigue, muscle ache, leg cramps etc. Also review lab results. It has showed improvement in total cholesterol and LDL. Review comparison of last 2 lipid panel with him. Also following cardiology. Was having chest discomfort. Stress test was negative. Discussed high BMI. discussed importance of weight loss and healthy life style. He has been modifying his diet to low fat and low carb diet since 2-3 months. Also trying to do more exercise  but due to work schedule involves travel and long hours standing able to do average twice a week. Given hand out on exercise and diet in [employer]. He agrees to work on it. No more reoccurrence of chest pain. No headache or dizziness, no sob. No palpitation. No ext weakness, no unusual fatigue. No nausea or vomiting. No abdominal pain. No urinary or bowel complains. Lab Results   Component Value Date/Time    Cholesterol, total 152 02/01/2019 12:00 AM    HDL Cholesterol 39 (L) 02/01/2019 12:00 AM    LDL, calculated 92 02/01/2019 12:00 AM    VLDL, calculated 21 02/01/2019 12:00 AM    Triglyceride 104 02/01/2019 12:00 AM     Lab Results   Component Value Date/Time    Sodium 140 03/07/2016 04:20 PM    Potassium 3.8 03/07/2016 04:20 PM    Chloride 104 03/07/2016 04:20 PM    CO2 29 03/07/2016 04:20 PM    Anion gap 7 03/07/2016 04:20 PM    Glucose 137 (H) 03/07/2016 04:20 PM    BUN 16 03/07/2016 04:20 PM    Creatinine 1.20 03/07/2016 04:20 PM    BUN/Creatinine ratio 13 03/07/2016 04:20 PM    GFR est AA >60 03/07/2016 04:20 PM    GFR est non-AA >60 03/07/2016 04:20 PM    Calcium 9.2 03/07/2016 04:20 PM    Bilirubin, total 0.5 03/07/2016 04:20 PM    AST (SGOT) 17 03/07/2016 04:20 PM    Alk.  phosphatase 76 03/07/2016 04:20 PM    Protein, total 7.4 03/07/2016 04:20 PM    Albumin 3.8 03/07/2016 04:20 PM    Globulin 3.6 03/07/2016 04:20 PM    A-G Ratio 1.1 03/07/2016 04:20 PM    ALT (SGPT) 31 03/07/2016 04:20 PM   was feeling dysphagia. Had esophageal dilatation and it has been completely resolved. No concern. No unintentional change in appetite or weight. ROS: see HPI     Physical Exam   Constitutional: He is oriented to person, place, and time. No distress. Cardiovascular: Normal rate, regular rhythm and normal heart sounds. Pulmonary/Chest:   CTA   Abdominal: Soft. Bowel sounds are normal. There is no tenderness. Musculoskeletal: He exhibits no edema. Neurological: He is oriented to person, place, and time. Psychiatric: His behavior is normal.       ASSESSMENT and PLAN    ICD-10-CM ICD-9-CM    1. Hyperlipidemia, unspecified hyperlipidemia type: continue statin at current dose. Diet modification, exercise as tolerated. Continue f/u with cardiology as recommended. E78.5 272.4    2. Dysphagia, unspecified type: resolved post EGD esophageal dilatation. R13.10 787.20    3. BMI 34.0-34.9,adult: Discussed high BMI. discussed importance of weight loss and healthy life style. He has been modifying his diet to low fat and low carb diet since 2-3 months. Also trying to do more exercise  but due to work schedule involves travel and long hours standing able to do average twice a week. Given hand out on exercise and diet in [facility]. He agrees to work on it. Z68.34 V85.34    Pt understood and agree with the plan   Review    Follow-up and Dispositions    · Return in about 6 months (around 10/5/2019).

## 2019-04-05 NOTE — PATIENT INSTRUCTIONS
Learning About Low-Fat Eating  What is low-fat eating? Most food has some fat in it. Your body needs some fat to be healthy. But some kinds of fats are healthier than others. In a low-fat eating plan, you try to choose healthier fats and eat fewer unhealthy fats. Healthy fats include olive and canola oil. Try to avoid eating too much saturated fat (such as in cheese and meats) and trans fat (a type of fat found in many packaged snack foods and other baked goods). You do not need to cut all fat from your diet. But you can make healthier choices about the types and amount of fat you eat. Even though it is a good idea to choose healthier fats, it is still important to be careful of how much fat you eat, because all fats are high in calories. What are the different types of fats? Unhealthy fats  · Saturated fat. Saturated fats are mostly in animal foods, such as meat and dairy foods. Tropical oils, such as coconut oil, palm oil, and cocoa butter, are also saturated fats. · Trans fat. Trans fats include shortening, partially hydrogenated vegetable oils, and hydrogenated vegetable oils. Trans fats are made when a liquid fat is turned into a solid fat (for example, when corn oil is made into stick margarine). They are in many processed foods, such as cookies, crackers, and snack foods. Healthy fats  · Monounsaturated fat. Monounsaturated fats are liquid at room temperature but get solid when refrigerated. Eating foods that are high in this fat may help lower your \"bad\" (LDL) cholesterol, keep your \"good\" (HDL) cholesterol level up, and lower your chances of getting coronary artery disease. This fat is found in canola oil, olive oil, peanut oil, olives, avocados, nuts, and nut butters. · Polyunsaturated fat. Polyunsaturated fats are liquid at room temperature. They are in safflower, sunflower, and corn oils. They are also the main fat in seafood. Omega-3 fatty acids are types of polyunsaturated fat.  Eating fish may lower your chances of getting coronary artery disease. Fatty fish such as salmon and mackerel contain these healthy fatty acids. So do ground flaxseeds and flaxseed oil, soybeans, walnuts, and seeds. Why cut down on unhealthy fats? Eating foods that contain saturated fats can raise the LDL (\"bad\") cholesterol in your blood. Having a high level of LDL cholesterol increases your chance of hardening of the arteries (atherosclerosis), which can lead to heart disease, heart attack, and stroke. Trans fat raises the level of \"bad\" LDL cholesterol in your blood and may lower the \"good\" HDL cholesterol in your blood. Trans fat can raise your risk of heart disease, heart attack, and stroke. In general:  · No more than 10% of your daily calories should come from saturated fat. This is about 20 grams in a 2,000-calorie diet. · No more than 10% of your daily calories should come from polyunsaturated fat. This is about 20 grams in a 2,000-calorie diet. · Monounsaturated fats can be up to 15% of your daily calories. This is about 25 to 30 grams in a 2,000-calorie diet. If you're not sure how much fat you should be eating or how many calories you need each day to stay at a healthy weight, talk to a registered dietitian. He or she can help you create a plan that's right for you. What can you do to cut down on fat? Foods like cheese, butter, sausage, and desserts can have a lot of unhealthy fats. Try these tips for healthier meals at home and when you eat out. At home  · Fill up on fruits, vegetables, and whole grains. · Think of meat as a side dish instead of as the main part of your meal.  · When you do eat meat, make it extra-lean ground beef (97% lean), ground turkey breast (without skin added), meats with fat trimmed off before cooking, or skinless chicken. · Try main dishes that use whole wheat pasta, brown rice, dried beans, or vegetables.   · Use cooking methods that use little or no fat, such as broiling, steaming, or grilling. Use cooking spray instead of oil. If you use oil, use a monounsaturated oil, such as canola or olive oil. · Read food labels on canned, bottled, or packaged foods. Choose those with little saturated fat and no trans fat. When eating out at a restaurant  · Order foods that are broiled or poached instead of fried or breaded. · Cut back on the amount of butter or margarine that you use on bread. Use small amounts of olive oil instead. · Order sauces, gravies, and salad dressings on the side, and use only a little. · When you order pasta, choose tomato sauce instead of cream sauce. · Ask for salsa with your baked potato instead of sour cream, butter, cheese, or barrett. Where can you learn more? Go to http://swapnilBootleg Marketotilia.info/. Enter E458 in the search box to learn more about \"Learning About Low-Fat Eating. \"  Current as of: March 28, 2018  Content Version: 11.9  © 8804-2717 [employer]. Care instructions adapted under license by [employer] (which disclaims liability or warranty for this information). If you have questions about a medical condition or this instruction, always ask your healthcare professional. [address] any warranty or liability for your use of this information. Eating Healthy Foods: Care Instructions  Your Care Instructions    Eating healthy foods can help lower your risk for disease. Healthy food gives you energy and keeps your heart strong, your brain active, your muscles working, and your bones strong. A healthy diet includes a variety of foods from the basic food groups: grains, vegetables, fruits, milk and milk products, and meat and beans. Some people may eat more of their favorite foods from only one food group and, as a result, miss getting the nutrients they need. So, it is important to pay attention not only to what you eat but also to what you are missing from your diet.  You can eat a healthy, balanced diet by making a few small changes. Follow-up care is a key part of your treatment and safety. Be sure to make and go to all appointments, and call your doctor if you are having problems. It's also a good idea to know your test results and keep a list of the medicines you take. How can you care for yourself at home? Look at what you eat  · Keep a food diary for a week or two and record everything you eat or drink. Track the number of servings you eat from each food group. · For a balanced diet every day, eat a variety of:  ? 6 or more ounce-equivalents of grains, such as cereals, breads, crackers, rice, or pasta, every day. An ounce-equivalent is 1 slice of bread, 1 cup of ready-to-eat cereal, or ½ cup of cooked rice, cooked pasta, or cooked cereal.  ? 2½ cups of vegetables, especially:  § Dark-green vegetables such as broccoli and spinach. § Orange vegetables such as carrots and sweet potatoes. § Dry beans (such as rees and kidney beans) and peas (such as lentils). ? 2 cups of fresh, frozen, or canned fruit. A small apple or 1 banana or orange equals 1 cup. ? 3 cups of nonfat or low-fat milk, yogurt, or other milk products. ? 5½ ounces of meat and beans, such as chicken, fish, lean meat, beans, nuts, and seeds. One egg, 1 tablespoon of peanut butter, ½ ounce nuts or seeds, or ¼ cup of cooked beans equals 1 ounce of meat. · Learn how to read food labels for serving sizes and ingredients. Fast-food and convenience-food meals often contain few or no fruits or vegetables. Make sure you eat some fruits and vegetables to make the meal more nutritious. · Look at your food diary. For each food group, add up what you have eaten and then divide the total by the number of days. This will give you an idea of how much you are eating from each food group. See if you can find some ways to change your diet to make it more healthy.   Start small  · Do not try to make dramatic changes to your diet all at once. You might feel that you are missing out on your favorite foods and then be more likely to fail. · Start slowly, and gradually change your habits. Try some of the following:  ? Use whole wheat bread instead of white bread. ? Use nonfat or low-fat milk instead of whole milk. ? Eat brown rice instead of white rice, and eat whole wheat pasta instead of white-flour pasta. ? Try low-fat cheeses and low-fat yogurt. ? Add more fruits and vegetables to meals and have them for snacks. ? Add lettuce, tomato, cucumber, and onion to sandwiches. ? Add fruit to yogurt and cereal.  Enjoy food  · You can still eat your favorite foods. You just may need to eat less of them. If your favorite foods are high in fat, salt, and sugar, limit how often you eat them, but do not cut them out entirely. · Eat a wide variety of foods. Make healthy choices when eating out  · The type of restaurant you choose can help you make healthy choices. Even fast-food chains are now offering more low-fat or healthier choices on the menu. · Choose smaller portions, or take half of your meal home. · When eating out, try:  ? A veggie pizza with a whole wheat crust or grilled chicken (instead of sausage or pepperoni). ? Pasta with roasted vegetables, grilled chicken, or marinara sauce instead of cream sauce. ? A vegetable wrap or grilled chicken wrap. ? Broiled or poached food instead of fried or breaded items. Make healthy choices easy  · Buy packaged, prewashed, ready-to-eat fresh vegetables and fruits, such as baby carrots, salad mixes, and chopped or shredded broccoli and cauliflower. · Buy packaged, presliced fruits, such as melon or pineapple. · Choose 100% fruit or vegetable juice instead of soda. Limit juice intake to 4 to 6 oz (½ to ¾ cup) a day. · Blend low-fat yogurt, fruit juice, and canned or frozen fruit to make a smoothie for breakfast or a snack. Where can you learn more?   Go to http://swapnil-otilia.info/. Enter T756 in the search box to learn more about \"Eating Healthy Foods: Care Instructions. \"  Current as of: March 28, 2018  Content Version: 11.9  © 1142-6792 [employer]. Care instructions adapted under license by [employer] (which disclaims liability or warranty for this information). If you have questions about a medical condition or this instruction, always ask your healthcare professional. [place] [address] any warranty or liability for your use of this information. Learning About Physical Activity  What is physical activity? Physical activity is any kind of activity that gets your body moving. The types of physical activity that can help you get fit and stay healthy include:  · Aerobic or \"cardio\" activities that make your heart beat faster and make you breathe harder, such as brisk walking, riding a bike, or running. Aerobic activities strengthen your heart and lungs and build up your endurance. · Strength training activities that make your muscles work against, or \"resist,\" something, such as lifting weights or doing push-ups. These activities help tone and strengthen your muscles. · Stretches that allow you to move your joints and muscles through their full range of motion. Stretching helps you be more flexible and avoid injury. What are the benefits of physical activity? Being active is one of the best things you can do to get fit and stay healthy. It helps you to:  · Feel stronger and have more energy to do all the things you like to do. · Focus better at school or work and perform better in sports. · Feel, think, and sleep better. · Reach and stay at a healthy weight. · Lose fat and build lean muscle. · Lower your risk for serious health problems. · Keep your bones, muscles, and joints strong. Being fit lets you do more physical activity.  And it lets you work out harder without as much effort. How can you make physical activity part of your life? Get at least 30 minutes of exercise on most days of the week. Walking is a good choice. You also may want to do other activities, such as running, swimming, cycling, or playing tennis or team sports. Pick activities that you like--ones that make your heart beat faster, your muscles stronger, and your muscles and joints more flexible. If you find more than one thing you like doing, do them all. You don't have to do the same thing every day. Get your heart pumping every day. Any activity that makes your heart beat faster and keeps it at that rate for a while counts. Here are some great ways to get your heart beating faster:  · Go for a brisk walk, run, or bike ride. · Go for a hike or swim. · Go in-line skating. · Play a game of touch football, basketball, or soccer. · Ride a bike. · Play tennis or racquetball. · Climb stairs. Even some household chores can be aerobic--just do them at a faster pace. Vacuuming, raking or mowing the lawn, sweeping the garage, and washing and waxing the car all can help get your heart rate up. Strengthen your muscles during the week. You don't have to lift heavy weights or grow big, bulky muscles to get stronger. Doing a few simple activities that make your muscles work against, or \"resist,\" something can help you get stronger. For example, you can:  · Do push-ups or sit-ups, which use your own body weight as resistance. · Lift weights or dumbbells or use stretch bands at home or in a gym or community center. Stretch your muscles often. Stretching will help you as you become more active. It can help you stay flexible, loosen tight muscles, and avoid injury. It can also help improve your balance and posture and can be a great way to relax. Be sure to stretch the muscles you'll be using when you work out. It's best to warm your muscles slightly before you stretch them.  Walk or do some other light aerobic activity for a few minutes, and then start stretching. When you stretch your muscles:  · Do it slowly. Stretching is not about going fast or making sudden movements. · Don't push or bounce during a stretch. · Hold each stretch for at least 15 to 30 seconds, if you can. You should feel a stretch in the muscle, but not pain. · Breathe out as you do the stretch. Then breathe in as you hold the stretch. Don't hold your breath. If you're worried about how more activity might affect your health, have a checkup before you start. Follow any special advice your doctor gives you for getting a smart start. Where can you learn more? Go to http://swapnil-otilia.info/. Enter V588 in the search box to learn more about \"Learning About Physical Activity. \"  Current as of: August 19, 2018  Content Version: 11.9  © 7894-4418 [employer]. Care instructions adapted under license by [employer] (which disclaims liability or warranty for this information). If you have questions about a medical condition or this instruction, always ask your healthcare professional. [address] any warranty or liability for your use of this information.

## 2019-05-15 RX ORDER — ATORVASTATIN CALCIUM 40 MG/1
40 TABLET, FILM COATED ORAL DAILY
Qty: 30 TAB | Refills: 6 | Status: SHIPPED | OUTPATIENT
Start: 2019-05-15 | End: 2020-01-07

## 2019-07-23 ENCOUNTER — HOSPITAL ENCOUNTER (OUTPATIENT)
Dept: LAB | Age: 54
Discharge: HOME | End: 2019-07-23

## 2019-07-23 LAB — XX-LABCORP SPECIMEN COL,LCBCF: NORMAL

## 2019-07-23 PROCEDURE — 99001 SPECIMEN HANDLING PT-LAB: CPT

## 2019-07-24 LAB
ALBUMIN SERPL-MCNC: 4.2 G/DL (ref 3.5–5.5)
ALP SERPL-CCNC: 99 IU/L (ref 39–117)
ALT SERPL-CCNC: 19 IU/L (ref 0–44)
AST SERPL-CCNC: 15 IU/L (ref 0–40)
BILIRUB DIRECT SERPL-MCNC: 0.16 MG/DL (ref 0–0.4)
BILIRUB SERPL-MCNC: 0.7 MG/DL (ref 0–1.2)
CHOLEST SERPL-MCNC: 158 MG/DL (ref 100–199)
HDLC SERPL-MCNC: 37 MG/DL
INTERPRETATION, 910389: NORMAL
LDLC SERPL CALC-MCNC: 81 MG/DL (ref 0–99)
PROT SERPL-MCNC: 6.8 G/DL (ref 6–8.5)
SPECIMEN STATUS REPORT, ROLRST: NORMAL
TRIGL SERPL-MCNC: 198 MG/DL (ref 0–149)
VLDLC SERPL CALC-MCNC: 40 MG/DL (ref 5–40)

## 2019-07-26 NOTE — PROGRESS NOTES
Per your last office note'  Impression and Plan:  Mendel Mann is a 47 y.o. male with:     1.) Atypical chest pain, likely esophageal etiology- resolved post dilation + PPI  2.) Heterozygous familial hyperlipidemia, had great/ expected goal with HI statin, continue current care, our ultimate goal will be LDL <70 but agree can hold off on +Zetia + PCSK9 inhibitor  3.) PreHTN, I believe his SBP is creeping up due to his recent weight gain and is also likely to impace his LDL     1.) Continue Atorvastatin 40 qhs (high intensity)  2.) Recheck FLP + LFTs in 6 months.   3.) Aggressive risk factor modification discussed- includes low sodium/ low fat diet with routine aerobic exercise. Goal of at least 15-20 lbs at our next visit.     Hopefully with weight loss/ diet modification- we can avoid escalating Jesse's medical therapy. I spent > 30 minutes counseling and asked him to call me sooner if questions/ problems. All his questions were answered.     Thank you for allowing me to participate in the care of your patient, please do not hesitate to call with questions or concerns.     Follow-up Disposition:  Return in about 6 months (around 8/8/2019).      Raman Barroso, DO

## 2019-07-29 ENCOUNTER — TELEPHONE (OUTPATIENT)
Dept: CARDIOLOGY CLINIC | Age: 54
End: 2019-07-29

## 2019-07-29 NOTE — TELEPHONE ENCOUNTER
----- Message from Kamilah Lyon DO sent at 7/26/2019  1:00 PM EDT -----  Let pt know his LDL looks great, and we can discuss further at his visit next week    ----- Message -----  From: Maryuri Carmona LPN  Sent: 2/56/5065   8:18 AM EDT  To: Kamilah Lyon DO    Per your last office note'  Impression and Plan:  Matteo Miller is a 47 y.o. male with:     1.) Atypical chest pain, likely esophageal etiology- resolved post dilation + PPI  2.) Heterozygous familial hyperlipidemia, had great/ expected goal with HI statin, continue current care, our ultimate goal will be LDL <70 but agree can hold off on +Zetia + PCSK9 inhibitor  3.) PreHTN, I believe his SBP is creeping up due to his recent weight gain and is also likely to impace his LDL     1.) Continue Atorvastatin 40 qhs (high intensity)  2.) Recheck FLP + LFTs in 6 months.   3.) Aggressive risk factor modification discussed- includes low sodium/ low fat diet with routine aerobic exercise. Goal of at least 15-20 lbs at our next visit.     Hopefully with weight loss/ diet modification- we can avoid escalating Jesse's medical therapy. I spent > 30 minutes counseling and asked him to call me sooner if questions/ problems. All his questions were answered.     Thank you for allowing me to participate in the care of your patient, please do not hesitate to call with questions or concerns.     Follow-up Disposition:  Return in about 6 months (around 8/8/2019).      Kamilah Lyon DO

## 2019-08-01 ENCOUNTER — OFFICE VISIT (OUTPATIENT)
Dept: CARDIOLOGY CLINIC | Age: 54
End: 2019-08-01

## 2019-08-01 VITALS
BODY MASS INDEX: 35.08 KG/M2 | SYSTOLIC BLOOD PRESSURE: 132 MMHG | HEART RATE: 74 BPM | WEIGHT: 259 LBS | OXYGEN SATURATION: 99 % | HEIGHT: 72 IN | DIASTOLIC BLOOD PRESSURE: 82 MMHG

## 2019-08-01 DIAGNOSIS — Z82.49 FAMILY HISTORY OF ISCHEMIC HEART DISEASE: Primary | ICD-10-CM

## 2019-08-01 DIAGNOSIS — R00.2 PALPITATIONS: ICD-10-CM

## 2019-08-01 DIAGNOSIS — E78.5 HYPERLIPIDEMIA, UNSPECIFIED HYPERLIPIDEMIA TYPE: ICD-10-CM

## 2019-08-01 NOTE — PROGRESS NOTES
Stephen Hurt    Chief Complaint   Patient presents with    Cholesterol Problem     6 month follow up     HPI    Stephen Hurt is a 49 y.o. male with no known cardiac disease who initially presented to me back in 2018 for dyslipidemia and chest pain. Atypical CP/ GI etiology:  As you know, Nidhi Escoto is a very pleasant gentleman who presented to the ED ([facility]) after experiencing somewhat sudden onset substernal chest pressure. He was laying down at night and noted the discomfort, it lasted about 15 minutes and aborted spontaneously. Based on his risk factors, I sent him for a stress test 2018 which was normal. He ended up seeing his GI- had an EGD with dilation, has been on PPIs and his discomfort has resolved. He has no current complaints. Hyperlipidemia:  He has a long history of hyperlipidemia, and tells me his LDL was even 190-200s when he was in the best shape of his life/ in his 35s. He has tried a few different statins over the years but stopped because he didn't like how they made him feel. Specifically, no indication of severe rhabdo- rather vague muscle fatigue and cramping in his legs. It should be noted he has a positive FH of premature CAD in his father who had his first heart attack at approx age 62, subsequently had a stroke and ultimately  of complications/ heart failure. His mother  at age 76 of pancreatic cancer. He has no siblings, but has 3 children. He has never smoked but was exposed to some second hand smoke from his father. I started him on high intensity statin- atorvastatin and checked in on him frequently. No intolerances noticed and we achieved reduction his LDL with in 3 months, which was reviewed with him today. He continues to improve and his LDL is even better as listed below. PreHTN/ Weight Gain:  His weight has been somewhat up and down but he continues to be on the right path.  He struggles the most with food choices (not portions). He doesn't like most vegetables and prefers carbs (bagels) and sweets. He drinks caffeine free tea with splenda with every meal.      Past Medical History:   Diagnosis Date    Hypercholesterolemia      Patient Active Problem List   Diagnosis Code    Dysphagia R13.10    Family history of ischemic heart disease Z82.49    Hyperlipidemia E78.5    Onychomycosis B35.1    Severe obesity ([address] E66.01     Past Surgical History:   Procedure Laterality Date    HX ORTHOPAEDIC      ankle, arm    HX WISDOM TEETH EXTRACTION       Current Outpatient Medications   Medication Sig Dispense Refill    atorvastatin (LIPITOR) 40 mg tablet Take 1 Tab by mouth daily. 30 Tab 6    omeprazole (PRILOSEC) 40 mg capsule Take 1 Cap by mouth daily.  cetirizine (ZYRTEC) 10 mg tablet Take 10 mg by mouth as needed. Allergies   Allergen Reactions    Pcn [Penicillins] Unknown (comments)    Statins-Hmg-Coa Reductase Inhibitors Other (comments)     Generalize fatigue and body ache. Probably taken simvastatin. Family History   Problem Relation Age of Onset    Diabetes Father     Heart Attack Father 61    Stroke Father 64    Hypertension Father    Iban Jimbo Arthritis-rheumatoid Mother     Dementia Maternal Grandmother     Heart Disease Maternal Grandfather 54     Self employed    Review of Systems    Review of Systems negative unless otherwise mentioned in the HPI. Physical Exam:    Visit Vitals  /82   Pulse 74   Ht 6' (1.829 m)   Wt 117.5 kg (259 lb)   SpO2 99%   BMI 35.13 kg/m²      Wt Readings from Last 3 Encounters:   08/01/19 117.5 kg (259 lb)   04/05/19 117 kg (258 lb)   02/08/19 118.8 kg (262 lb)     Physical Exam   Constitutional: He is oriented to person, place, and time. He appears well-developed and well-nourished. No distress. HENT:   Head: Atraumatic. Eyes: Pupils are equal, round, and reactive to light. EOM are normal. No scleral icterus. Neck: No JVD present. No thyromegaly present. Cardiovascular: Normal rate, regular rhythm, normal heart sounds and intact distal pulses. Exam reveals no gallop and no friction rub. No murmur heard. Pulmonary/Chest: Breath sounds normal.   Abdominal: Soft. Musculoskeletal: He exhibits no edema. Neurological: He is alert and oriented to person, place, and time. Skin: Skin is warm and dry. No rash noted. He is not diaphoretic. No erythema. No pallor. No xanthomas noted   Psychiatric: He has a normal mood and affect. His behavior is normal.     Last  (9/7/2018)    Lab Results   Component Value Date/Time    Cholesterol, total 158 07/23/2019 11:03 AM    HDL Cholesterol 37 (L) 07/23/2019 11:03 AM    LDL, calculated 81 07/23/2019 11:03 AM    VLDL, calculated 40 07/23/2019 11:03 AM    Triglyceride 198 (H) 07/23/2019 11:03 AM       11/01/18   ECHO STRESS 11/01/2018 11/1/2018    Narrative · Stress echocardiogram is normal. Low risk study. · Baseline ECG: Normal sinus rhythm. · Negative stress electrocardiogram.        Signed by: Katie Bowers MD       Impression and Plan:  Rafal Muñiz is a 49 y.o. male with:    1.) Atypical chest pain, likely esophageal etiology- resolved post dilation + PPI  2.) Heterozygous familial hyperlipidemia, had great/ expected goal with HI statin, continue current care, our ultimate goal will be LDL <70 but agree can hold off on +Zetia + PCSK9 inhibitor  3.) PreHTN, his SBP is 130s    1.) Continue Atorvastatin 40 qhs (high intensity)  2.) Recheck FLP in one year  3.) Aggressive risk factor modification discussed- includes more of a [place] type diet of more fresh fruits and vegetables/ less simple sugars and refined processed foods. Goal of at least 15-20 lbs at our next visit. Hopefully with weight loss/ diet modification- we can avoid escalating Jesse's medical therapy. I spent > 30 minutes counseling and asked him to call me sooner if questions/ problems. All his questions were answered.     Thank you for allowing me to participate in the care of your patient, please do not hesitate to call with questions or concerns.     Kindest Regards,    Piedad Wood, DO

## 2019-08-01 NOTE — PROGRESS NOTES
Blayne Terri presents today for   Chief Complaint   Patient presents with    Cholesterol Problem     6 month follow up       Blayne Murray preferred language for health care discussion is english/other. Is someone accompanying this pt? no    Is the patient using any DME equipment during [address]? no    Depression Screening:  3 most recent PHQ Screens 8/1/2019   Little interest or pleasure in doing things Not at all   Feeling down, depressed, irritable, or hopeless Not at all   Total Score PHQ 2 0       Learning Assessment:  Learning Assessment 8/1/2019   PRIMARY LEARNER Patient   HIGHEST LEVEL OF EDUCATION - PRIMARY LEARNER  GRADUATED HIGH SCHOOL OR GED   BARRIERS PRIMARY LEARNER NONE   CO-LEARNER CAREGIVER No   PRIMARY LANGUAGE ENGLISH    NEED -   LEARNER PREFERENCE PRIMARY READING     -   LEARNING SPECIAL TOPICS -   ANSWERED BY self   RELATIONSHIP SELF       Abuse Screening:  Abuse Screening Questionnaire 4/5/2019   Do you ever feel afraid of your partner? N   Are you in a relationship with someone who physically or mentally threatens you? N   Is it safe for you to go home? Y       Fall Risk  No flowsheet data found. Pt currently taking Anticoagulant therapy? no    Coordination of Care:  1. Have you been to the ER, urgent care clinic since your last visit? Hospitalized since your last visit? no    2. Have you seen or consulted any other health care providers outside of the [address] [place] [address] since your last visit? Include any pap smears or colon screening.

## 2019-10-07 ENCOUNTER — OFFICE VISIT (OUTPATIENT)
Dept: FAMILY MEDICINE CLINIC | Age: 54
End: 2019-10-07

## 2019-10-07 VITALS
BODY MASS INDEX: 35.73 KG/M2 | RESPIRATION RATE: 16 BRPM | WEIGHT: 263.8 LBS | SYSTOLIC BLOOD PRESSURE: 116 MMHG | DIASTOLIC BLOOD PRESSURE: 80 MMHG | OXYGEN SATURATION: 97 % | HEART RATE: 82 BPM | HEIGHT: 72 IN | TEMPERATURE: 98.5 F

## 2019-10-07 DIAGNOSIS — Z82.49 FAMILY HISTORY OF ISCHEMIC HEART DISEASE: ICD-10-CM

## 2019-10-07 DIAGNOSIS — B35.1 ONYCHOMYCOSIS: ICD-10-CM

## 2019-10-07 DIAGNOSIS — Z23 ENCOUNTER FOR IMMUNIZATION: ICD-10-CM

## 2019-10-07 DIAGNOSIS — E78.5 HYPERLIPIDEMIA, UNSPECIFIED HYPERLIPIDEMIA TYPE: Primary | ICD-10-CM

## 2019-10-07 DIAGNOSIS — R13.10 DYSPHAGIA, UNSPECIFIED TYPE: ICD-10-CM

## 2019-10-07 NOTE — PROGRESS NOTES
HISTORY OF PRESENT ILLNESS  Beau Suarez is a 49 y.o. male. HPI: here for follow up. H/o hyperlipidemia. Elevated LDL and also had chest pain. Seen by cardiology. Atypical chest pain. Since last visit no re occurance. Started on high dose statin. No side effects. Taking it with compliance. Much improved LDL. Discussed importance of weight loss, exercise and healthy diet. Wife is not physically fit. Taking lots of house responsibility. Busy life style and not much time for exercise per him. He will work on life style modification. Discussed benefit of weight loss and given hand out in AVS>   Review labs. Lab Results   Component Value Date/Time    WBC 9.5 03/07/2016 04:20 PM    HGB 14.3 03/07/2016 04:20 PM    HCT 44.0 03/07/2016 04:20 PM    PLATELET 630 96/06/8569 04:20 PM    MCV 88.5 03/07/2016 04:20 PM     Lab Results   Component Value Date/Time    Sodium 140 03/07/2016 04:20 PM    Potassium 3.8 03/07/2016 04:20 PM    Chloride 104 03/07/2016 04:20 PM    CO2 29 03/07/2016 04:20 PM    Anion gap 7 03/07/2016 04:20 PM    Glucose 137 (H) 03/07/2016 04:20 PM    BUN 16 03/07/2016 04:20 PM    Creatinine 1.20 03/07/2016 04:20 PM    BUN/Creatinine ratio 13 03/07/2016 04:20 PM    GFR est AA >60 03/07/2016 04:20 PM    GFR est non-AA >60 03/07/2016 04:20 PM    Calcium 9.2 03/07/2016 04:20 PM    Bilirubin, total 0.7 07/23/2019 11:03 AM    AST (SGOT) 15 07/23/2019 11:03 AM    Alk.  phosphatase 99 07/23/2019 11:03 AM    Protein, total 6.8 07/23/2019 11:03 AM    Albumin 4.2 07/23/2019 11:03 AM    Globulin 3.6 03/07/2016 04:20 PM    A-G Ratio 1.1 03/07/2016 04:20 PM    ALT (SGPT) 19 07/23/2019 11:03 AM     Lab Results   Component Value Date/Time    Cholesterol, total 158 07/23/2019 11:03 AM    HDL Cholesterol 37 (L) 07/23/2019 11:03 AM    LDL, calculated 81 07/23/2019 11:03 AM    VLDL, calculated 40 07/23/2019 11:03 AM    Triglyceride 198 (H) 07/23/2019 11:03 AM     Lab Results   Component Value Date/Time    TSH 2.110 05/09/2012 12:00 AM     Lab Results   Component Value Date/Time    Hemoglobin A1c 5.5 09/07/2018 12:30 PM      Also onychomycosis. Tried topical antifungal. Not much help. Agree to see podiatry. Discussed shingrix. He will speak with his pharmacist.   Agree to take flu shot. Visit Vitals  /80 (BP 1 Location: Left arm, BP Patient Position: Sitting)   Pulse 82   Temp 98.5 °F (36.9 °C) (Oral)   Resp 16   Ht 6' (1.829 m)   Wt 263 lb 12.8 oz (119.7 kg)   SpO2 97%   BMI 35.78 kg/m²     Review medication list, vitals, problem list,allergies. Denies any headache, dizziness, no chest pain or trouble breathing, no arm or leg weakness. No nausea or vomiting, no weight or appetite changes, no mood changes . No urine or bowel complains, no palpitation, no diaphoresis. No abdominal pain. No cold or cough. No leg swelling. No fever. No sleep trouble. ROS: see HPI     Physical Exam   Constitutional: He is oriented to person, place, and time. No distress. Neck: No thyromegaly present. Cardiovascular: Normal rate, regular rhythm and normal heart sounds. Pulmonary/Chest:   CTA   Abdominal: Soft. Bowel sounds are normal. There is no tenderness. Musculoskeletal: He exhibits no edema. Lymphadenopathy:     He has no cervical adenopathy. Neurological: He is oriented to person, place, and time. Skin:   Thick toe nails. Nail discoloration. Psychiatric: His behavior is normal.       ASSESSMENT and PLAN    ICD-10-CM ICD-9-CM    1. Hyperlipidemia, unspecified hyperlipidemia type: on statin. No side effects. Diet modification. Following cardiology. No longer chest pain. E78.5 272.4    2. Encounter for immunization Z23 V03.89 INFLUENZA VIRUS VAC QUAD,SPLIT,PRESV FREE SYRINGE IM   3. Family history of ischemic heart disease Z82.49 V17.3    4. Onychomycosis: for now sending to podiatry. B35.1 110.1 REFERRAL TO PODIATRY   5. Dysphagia, unspecified type R13.10 787.20     post esophageal dilatation.  asymptomatic 6. BMI 35.0-35.9,adult: discussed high BMI. Discussed diet modification, calorie count and exercise. Discussed importance of weight loss. agree to do exercise and life style modification.  Diet and exercise hand out given in AVS.    Z68.35 V85.35    Pt understood and agree with the plan   Review hM   See HPI   Follow-up and Dispositions    · Return in about 6 months (around 4/7/2020) for for physical .

## 2019-10-07 NOTE — PROGRESS NOTES
1. Have you been to the ER, urgent care clinic since your last visit? Hospitalized since your last visit? No    2. Have you seen or consulted any other health care providers outside of the [address] [place] [address] since your last visit? Include any pap smears or colon screening.  No    Chief Complaint   Patient presents with    Cholesterol Problem

## 2019-10-07 NOTE — PATIENT INSTRUCTIONS
Vaccine Information Statement    Influenza (Flu) Vaccine (Inactivated or Recombinant): What You Need to Know    Many Vaccine Information Statements are available in [place]ish and other languages. See www.immunize.org/vis  Hojas de información sobre vacunas están disponibles en español y en muchos otros idiomas. Visite www.immunize.org/vis    1. Why get vaccinated? Influenza vaccine can prevent influenza (flu). Flu is a contagious disease that spreads around the [facility] every year, usually between October and May. Anyone can get the flu, but it is more dangerous for some people. Infants and young children, people 72years of age and older, pregnant women, and people with certain health conditions or a weakened immune system are at greatest risk of flu complications. Pneumonia, bronchitis, sinus infections and ear infections are examples of flu-related complications. If you have a medical condition, such as heart disease, cancer or diabetes, flu can make it worse. Flu can cause fever and chills, sore throat, muscle aches, fatigue, cough, headache, and runny or stuffy nose. Some people may have vomiting and diarrhea, though this is more common in children than adults. Each year thousands of people in the [facility] die from flu, and many more are hospitalized. Flu vaccine prevents millions of illnesses and flu-related visits to the doctor each year. 2. Influenza vaccines     CDC recommends everyone 10months of age and older get vaccinated every flu season. Children 6 months through 6years of age may need 2 doses during a single flu season. Everyone else needs only 1 dose each flu season. It takes about 2 weeks for protection to develop after vaccination. There are many flu viruses, and they are always changing. Each year a new flu vaccine is made to protect against three or four viruses that are likely to cause disease in the upcoming flu season.  Even when the vaccine doesnt exactly match these viruses, it may still provide some protection. Influenza vaccine does not cause flu. Influenza vaccine may be given at the same time as other vaccines. 3. Talk with your health care provider    Tell your vaccine provider if the person getting the vaccine:   Has had an allergic reaction after a previous dose of influenza vaccine, or has any severe, life-threatening allergies.  Has ever had Guillain-Barré Syndrome (also called GBS). In some cases, your health care provider may decide to postpone influenza vaccination to a future visit. People with minor illnesses, such as a cold, may be vaccinated. People who are moderately or severely ill should usually wait until they recover before getting influenza vaccine. Your health care provider can give you more information. 4. Risks of a reaction     Soreness, redness, and swelling where shot is given, fever, muscle aches, and headache can happen after influenza vaccine.  There may be a very small increased risk of Guillain-Barré Syndrome (GBS) after inactivated influenza vaccine (the flu shot). [facility] [place] children who get the flu shot along with pneumococcal vaccine (PCV13), and/or DTaP vaccine at the same time might be slightly more likely to have a seizure caused by fever. Tell your health care provider if a child who is getting flu vaccine has ever had a seizure. People sometimes faint after medical procedures, including vaccination. Tell your provider if you feel dizzy or have vision changes or ringing in the ears. As with any medicine, there is a very remote chance of a vaccine causing a severe allergic reaction, other serious injury, or death. 5. What if there is a serious problem? An allergic reaction could occur after the vaccinated person leaves the clinic.  If you see signs of a severe allergic reaction (hives, swelling of the face and throat, difficulty breathing, a fast heartbeat, dizziness, or weakness), call 9-1-1 and get the person to the nearest hospital.    For other signs that concern you, call your health care provider. Adverse reactions should be reported to the Vaccine Adverse Event Reporting System (VAERS). Your health care provider will usually file this report, or you can do it yourself. Visit the VAERS website at www.vaers. [facility].gov or call 8-167.638.4344. VAERS is only for reporting reactions, and VAERS staff do not give medical advice. 6. The National Vaccine Injury Compensation Program    The [facility] Vaccine Injury Compensation Program (VICP) is a federal program that was created to compensate people who may have been injured by certain vaccines. Visit the VICP website at www.[facility].gov/vaccinecompensation or call 7-412.297.3027 to learn about the program and about filing a claim. There is a time limit to file a claim for compensation. 7. How can I learn more?  Ask your health care provider.  Call your local or state health department.  Contact the Centers for Disease Control and Prevention (CDC):  - Call 7-250.424.8004 (1-800-CDC-INFO) or  - Visit CDCs influenza website at www.cdc.gov/flu    Vaccine Information Statement (Interim)  Inactivated Influenza Vaccine   8/15/2019  43 CARRILLO Cami Jackeline [id number]   Department of Health and Human Services  Centers for Disease Control and Prevention    Office Use Only         Learning About Low-Fat Eating  What is low-fat eating? Most food has some fat in it. Your body needs some fat to be healthy. But some kinds of fats are healthier than others. In a low-fat eating plan, you try to choose healthier fats and eat fewer unhealthy fats. Healthy fats include olive and canola oil. Try to avoid eating too much saturated fat (such as in cheese and meats) and trans fat (a type of fat found in many packaged snack foods and other baked goods). You do not need to cut all fat from your diet.  But you can make healthier choices about the types and amount of fat you eat.  Even though it is a good idea to choose healthier fats, it is still important to be careful of how much fat you eat, because all fats are high in calories. What are the different types of fats? Unhealthy fats  · Saturated fat. Saturated fats are mostly in animal foods, such as meat and dairy foods. Tropical oils, such as coconut oil, palm oil, and cocoa butter, are also saturated fats. · Trans fat. Trans fats include shortening, partially hydrogenated vegetable oils, and hydrogenated vegetable oils. Trans fats are made when a liquid fat is turned into a solid fat (for example, when corn oil is made into stick margarine). They are in many processed foods, such as cookies, crackers, and snack foods. Healthy fats  · Monounsaturated fat. Monounsaturated fats are liquid at room temperature but get solid when refrigerated. Eating foods that are high in this fat may help lower your \"bad\" (LDL) cholesterol, keep your \"good\" (HDL) cholesterol level up, and lower your chances of getting coronary artery disease. This fat is found in canola oil, olive oil, peanut oil, olives, avocados, nuts, and nut butters. · Polyunsaturated fat. Polyunsaturated fats are liquid at room temperature. They are in safflower, sunflower, and corn oils. They are also the main fat in seafood. Omega-3 fatty acids are types of polyunsaturated fat. Eating fish may lower your chances of getting coronary artery disease. Fatty fish such as salmon and mackerel contain these healthy fatty acids. So do ground flaxseeds and flaxseed oil, soybeans, walnuts, and seeds. Why cut down on unhealthy fats? Eating foods that contain saturated fats can raise the LDL (\"bad\") cholesterol in your blood. Having a high level of LDL cholesterol increases your chance of hardening of the arteries (atherosclerosis), which can lead to heart disease, heart attack, and stroke.   Trans fat raises the level of \"bad\" LDL cholesterol in your blood and may lower the \"good\" HDL cholesterol in your blood. Trans fat can raise your risk of heart disease, heart attack, and stroke. In general:  · No more than 10% of your daily calories should come from saturated fat. This is about 20 grams in a 2,000-calorie diet. · No more than 10% of your daily calories should come from polyunsaturated fat. This is about 20 grams in a 2,000-calorie diet. · Monounsaturated fats can be up to 15% of your daily calories. This is about 25 to 30 grams in a 2,000-calorie diet. If you're not sure how much fat you should be eating or how many calories you need each day to stay at a healthy weight, talk to a registered dietitian. He or she can help you create a plan that's right for you. What can you do to cut down on fat? Foods like cheese, butter, sausage, and desserts can have a lot of unhealthy fats. Try these tips for healthier meals at home and when you eat out. At home  · Fill up on fruits, vegetables, and whole grains. · Think of meat as a side dish instead of as the main part of your meal.  · When you do eat meat, make it extra-lean ground beef (97% lean), ground turkey breast (without skin added), meats with fat trimmed off before cooking, or skinless chicken. · Try main dishes that use whole wheat pasta, brown rice, dried beans, or vegetables. · Use cooking methods that use little or no fat, such as broiling, steaming, or grilling. Use cooking spray instead of oil. If you use oil, use a monounsaturated oil, such as canola or olive oil. · Read food labels on canned, bottled, or packaged foods. Choose those with little saturated fat and no trans fat. When eating out at a restaurant  · Order foods that are broiled or poached instead of fried or breaded. · Cut back on the amount of butter or margarine that you use on bread. Use small amounts of olive oil instead. · Order sauces, gravies, and salad dressings on the side, and use only a little.   · When you order pasta, choose tomato sauce instead of cream sauce. · Ask for salsa with your baked potato instead of sour cream, butter, cheese, or barrett. Where can you learn more? Go to http://swapnil-otilia.info/. Enter N489 in the search box to learn more about \"Learning About Low-Fat Eating. \"  Current as of: November 7, 2018  Content Version: 12.2  © 3861-5714 [employer]. Care instructions adapted under license by [employer] (which disclaims liability or warranty for this information). If you have questions about a medical condition or this instruction, always ask your healthcare professional. [place] [address] any warranty or liability for your use of this information. Learning About Physical Activity  What is physical activity? Physical activity is any kind of activity that gets your body moving. The types of physical activity that can help you get fit and stay healthy include:  · Aerobic or \"cardio\" activities that make your heart beat faster and make you breathe harder, such as brisk walking, riding a bike, or running. Aerobic activities strengthen your heart and lungs and build up your endurance. · Strength training activities that make your muscles work against, or \"resist,\" something, such as lifting weights or doing push-ups. These activities help tone and strengthen your muscles. · Stretches that allow you to move your joints and muscles through their full range of motion. Stretching helps you be more flexible and avoid injury. What are the benefits of physical activity? Being active is one of the best things you can do to get fit and stay healthy. It helps you to:  · Feel stronger and have more energy to do all the things you like to do. · Focus better at school or work and perform better in sports. · Feel, think, and sleep better. · Reach and stay at a healthy weight. · Lose fat and build lean muscle. · Lower your risk for serious health problems.   · Keep your bones, muscles, and joints strong. Being fit lets you do more physical activity. And it lets you work out harder without as much effort. How can you make physical activity part of your life? Get at least 30 minutes of exercise on most days of the week. Walking is a good choice. You also may want to do other activities, such as running, swimming, cycling, or playing tennis or team sports. Pick activities that you like--ones that make your heart beat faster, your muscles stronger, and your muscles and joints more flexible. If you find more than one thing you like doing, do them all. You don't have to do the same thing every day. Get your heart pumping every day. Any activity that makes your heart beat faster and keeps it at that rate for a while counts. Here are some great ways to get your heart beating faster:  · Go for a brisk walk, run, or bike ride. · Go for a hike or swim. · Go in-line skating. · Play a game of touch football, basketball, or soccer. · Ride a bike. · Play tennis or racquetball. · Climb stairs. Even some household chores can be aerobic--just do them at a faster pace. Vacuuming, raking or mowing the lawn, sweeping the garage, and washing and waxing the car all can help get your heart rate up. Strengthen your muscles during the week. You don't have to lift heavy weights or grow big, bulky muscles to get stronger. Doing a few simple activities that make your muscles work against, or \"resist,\" something can help you get stronger. For example, you can:  · Do push-ups or sit-ups, which use your own body weight as resistance. · Lift weights or dumbbells or use stretch bands at home or in a gym or community center. Stretch your muscles often. Stretching will help you as you become more active. It can help you stay flexible, loosen tight muscles, and avoid injury. It can also help improve your balance and posture and can be a great way to relax.   Be sure to stretch the muscles you'll be using when you work out. It's best to warm your muscles slightly before you stretch them. Walk or do some other light aerobic activity for a few minutes, and then start stretching. When you stretch your muscles:  · Do it slowly. Stretching is not about going fast or making sudden movements. · Don't push or bounce during a stretch. · Hold each stretch for at least 15 to 30 seconds, if you can. You should feel a stretch in the muscle, but not pain. · Breathe out as you do the stretch. Then breathe in as you hold the stretch. Don't hold your breath. If you're worried about how more activity might affect your health, have a checkup before you start. Follow any special advice your doctor gives you for getting a smart start. Where can you learn more? Go to http://swapnil-otilia.info/. Enter S550 in the search box to learn more about \"Learning About Physical Activity. \"  Current as of: May 5, 2019  Content Version: 12.2  © 6022-4827 [employer]. Care instructions adapted under license by [employer] (which disclaims liability or warranty for this information). If you have questions about a medical condition or this instruction, always ask your healthcare professional. [place] [address] any warranty or liability for your use of this information. Toenail Fungus: Care Instructions  Your Care Instructions  A toenail that is infected by a fungus usually turns white or yellow. As the fungus spreads, the nail turns a darker color and gets thicker, and its edges start to turn ragged and crumble. A bad infection can cause toe pain, and the nail may pull away from the toe. Toenails that are exposed to moisture and warmth a lot are more likely to get infected by a fungus. This can happen from wearing sweaty shoes often and from walking barefoot on shower floors. It is hard to treat toenail fungus, and the infection can return after it has cleared up.  But medicines can sometimes get rid of toenail fungus for good. If the infection is very bad, or if it causes a lot of pain, you may need to have the nail removed. Follow-up care is a key part of your treatment and safety. Be sure to make and go to all appointments, and call your doctor if you are having problems. It's also a good idea to know your test results and keep a list of the medicines you take. How can you care for yourself at home? · Take your medicines exactly as prescribed. Call your doctor if you have any problems with your medicine. You will get more details on the specific medicines your doctor prescribes. · If your doctor gave you a cream or liquid to put on your toenail, use it exactly as directed. · Wash your feet often, and wash your hands after touching your feet. · Keep your toenails clean and dry. Dry your feet completely after you bathe and before you put on shoes and socks. · Keep your toenails trimmed. · Change socks often. Wear dry socks that absorb moisture. · Do not go barefoot in public places. · Use a spray or powder that fights fungus on your feet and in your shoes. · Do not pick at the skin around your nails. · Do not use nail polish or fake nails on your toenails. When should you call for help? Call your doctor now or seek immediate medical care if:    · You have signs of infection, such as:  ? Increased pain, swelling, warmth, or redness. ? Red streaks leading from the site. ? Pus draining from the site. ? A fever.     · You have new or increased toe pain.    Watch closely for changes in your health, and be sure to contact your doctor if:    · You do not get better as expected. Where can you learn more? Go to http://swapnil-otilia.info/. Enter D202 in the search box to learn more about \"Toenail Fungus: Care Instructions. \"  Current as of: April 1, 2019  Content Version: 12.2  © 6402-4782 [employer].  Care instructions adapted under license by [employer] (which disclaims liability or warranty for this information). If you have questions about a medical condition or this instruction, always ask your healthcare professional. [address] any warranty or liability for your use of this information.

## 2019-10-07 NOTE — PROGRESS NOTES
Patient presents for flu vaccine. Consent obtained, Tolerated procedure well at right deltoid. Patient remained in office 10 minutes post vaccine. No side effects noted.      Lot # [id number]  exp 06/25/20  [address] [place] [address]: [id number]

## 2020-01-07 RX ORDER — ATORVASTATIN CALCIUM 40 MG/1
TABLET, FILM COATED ORAL
Qty: 30 TAB | Refills: 6 | Status: SHIPPED | OUTPATIENT
Start: 2020-01-07 | End: 2020-08-07 | Stop reason: SDUPTHER

## 2020-07-27 ENCOUNTER — HOSPITAL ENCOUNTER (OUTPATIENT)
Dept: LAB | Age: 55
Discharge: HOME | End: 2020-07-27

## 2020-07-27 LAB — XX-LABCORP SPECIMEN COL,LCBCF: NORMAL

## 2020-07-27 PROCEDURE — 99001 SPECIMEN HANDLING PT-LAB: CPT

## 2020-07-28 LAB
ALBUMIN SERPL-MCNC: 4.2 G/DL (ref 3.8–4.9)
ALP SERPL-CCNC: 98 IU/L (ref 39–117)
ALT SERPL-CCNC: 17 IU/L (ref 0–44)
AST SERPL-CCNC: 17 IU/L (ref 0–40)
BILIRUB DIRECT SERPL-MCNC: 0.1 MG/DL (ref 0–0.4)
BILIRUB SERPL-MCNC: 0.4 MG/DL (ref 0–1.2)
CHOLEST SERPL-MCNC: 203 MG/DL (ref 100–199)
HDLC SERPL-MCNC: 36 MG/DL
INTERPRETATION, 910389: NORMAL
LDLC SERPL CALC-MCNC: 136 MG/DL (ref 0–99)
PROT SERPL-MCNC: 6.8 G/DL (ref 6–8.5)
TRIGL SERPL-MCNC: 154 MG/DL (ref 0–149)
VLDLC SERPL CALC-MCNC: 31 MG/DL (ref 5–40)

## 2020-07-30 NOTE — PROGRESS NOTES
Per your last note\" Marcia Bales is a 49 y.o. male with:     1.) Atypical chest pain, likely esophageal etiology- resolved post dilation + PPI  2.) Heterozygous familial hyperlipidemia, had great/ expected goal with HI statin, continue current care, our ultimate goal will be LDL <70 but agree can hold off on +Zetia + PCSK9 inhibitor  3.) PreHTN, his SBP is 130s     1.) Continue Atorvastatin 40 qhs (high intensity)  2.) Recheck FLP in one year  3.) Aggressive risk factor modification discussed- includes more of a [place] type diet of more fresh fruits and vegetables/ less simple sugars and refined processed foods. Goal of at least 15-20 lbs at our next visit.     Hopefully with weight loss/ diet modification- we can avoid escalating Jesse's medical therapy. I spent > 30 minutes counseling and asked him to call me sooner if questions/ problems. All his questions were answered.

## 2020-08-07 ENCOUNTER — OFFICE VISIT (OUTPATIENT)
Dept: CARDIOLOGY CLINIC | Age: 55
End: 2020-08-07

## 2020-08-07 VITALS
HEART RATE: 85 BPM | RESPIRATION RATE: 18 BRPM | OXYGEN SATURATION: 98 % | DIASTOLIC BLOOD PRESSURE: 90 MMHG | SYSTOLIC BLOOD PRESSURE: 132 MMHG

## 2020-08-07 DIAGNOSIS — Z82.49 FAMILY HISTORY OF ISCHEMIC HEART DISEASE: Primary | ICD-10-CM

## 2020-08-07 RX ORDER — ATORVASTATIN CALCIUM 40 MG/1
TABLET, FILM COATED ORAL
Qty: 30 TAB | Refills: 6 | Status: SHIPPED | OUTPATIENT
Start: 2020-08-07 | End: 2020-08-24

## 2020-08-07 NOTE — PROGRESS NOTES
Fani Chang    Chief Complaint   Patient presents with    Palpitations     HPI    Fani Chang is a 48 y.o. male with no known cardiac disease who initially presented to me back in 2018 for dyslipidemia and chest pain. Atypical CP/ GI etiology:  As you know, Liam Maldonado is a very pleasant gentleman who presented to the ED (18 [facility]) after experiencing somewhat sudden onset substernal chest pressure. He was laying down at night and noted the discomfort, it lasted about 15 minutes and aborted spontaneously. Based on his risk factors, I sent him for a stress test 2018 which was normal. He ended up seeing his GI- had an EGD with dilation, has been on PPIs and his discomfort has resolved. He has no current complaints. Hyperlipidemia:  He has a long history of hyperlipidemia, and tells me his LDL was even 190-200s when he was in the best shape of his life/ in his 35s. He has tried a few different statins over the years but stopped because he didn't like how they made him feel. Specifically, no indication of severe rhabdo- rather vague muscle fatigue and cramping in his legs. It should be noted he has a positive FH of premature CAD in his father who had his first heart attack at approx age 62, subsequently had a stroke and ultimately  of complications/ heart failure. His mother  at age 76 of pancreatic cancer. He has no siblings, but has 3 children. He has never smoked but was exposed to some second hand smoke from his father. I started him on high intensity statin- atorvastatin and checked in on him frequently. No intolerances noticed and we achieved reduction his LDL with in 3 months, which was reviewed with him today. He continues to improve and his LDL is even better as listed below. PreHTN/ Weight Gain:  His weight has been somewhat up and down but he continues to be on the right path. He struggles the most with food choices (not portions).  He doesn't like most vegetables and prefers carbs (bagels) and sweets. He drinks caffeine free tea with splenda with every meal.      Past Medical History:   Diagnosis Date    Hypercholesterolemia      Patient Active Problem List   Diagnosis Code    Dysphagia R13.10    Family history of ischemic heart disease Z82.49    Hyperlipidemia E78.5    Onychomycosis B35.1    Severe obesity ([facility] [address] E66.01     Past Surgical History:   Procedure Laterality Date    HX ORTHOPAEDIC      ankle, arm    HX WISDOM TEETH EXTRACTION       Current Outpatient Medications   Medication Sig Dispense Refill    atorvastatin (LIPITOR) 40 mg tablet TAKE ONE TABLET ONCE DAILY 30 Tab 6    omeprazole (PRILOSEC) 40 mg capsule Take 1 Cap by mouth daily.  cetirizine (ZYRTEC) 10 mg tablet Take 10 mg by mouth as needed. Allergies   Allergen Reactions    Pcn [Penicillins] Unknown (comments)    Statins-Hmg-Coa Reductase Inhibitors Other (comments)     Generalize fatigue and body ache. Probably taken simvastatin. Family History   Problem Relation Age of Onset    Diabetes Father     Heart Attack Father 61    Stroke Father 64    Hypertension Father    [facility] Arthritis-rheumatoid Mother     Dementia Maternal Grandmother     Heart Disease Maternal Grandfather 54     Self employed    Review of Systems    Review of Systems negative unless otherwise mentioned in the HPI. Physical Exam:    Visit Vitals  /90 (BP 1 Location: Left arm, BP Patient Position: Sitting)   Pulse 85   Resp 18   SpO2 98%      Wt Readings from Last 3 Encounters:   10/07/19 119.7 kg (263 lb 12.8 oz)   08/01/19 117.5 kg (259 lb)   04/05/19 117 kg (258 lb)     Physical Exam  Constitutional:       General: He is not in acute distress. Appearance: He is well-developed. He is not diaphoretic. HENT:      Head: Atraumatic. Eyes:      General: No scleral icterus. Pupils: Pupils are equal, round, and reactive to light. Neck:      Thyroid: No thyromegaly.       Vascular: No JVD.   Cardiovascular:      Rate and Rhythm: Normal rate and regular rhythm. Heart sounds: Normal heart sounds. No murmur. No friction rub. No gallop. Pulmonary:      Breath sounds: Normal breath sounds. Abdominal:      Palpations: Abdomen is soft. Skin:     General: Skin is warm and dry. Coloration: Skin is not pale. Findings: No erythema or rash. Comments: No xanthomas noted   Neurological:      Mental Status: He is alert and oriented to person, place, and time. Psychiatric:         Behavior: Behavior normal.       Last  (9/7/2018)    Lab Results   Component Value Date/Time    Cholesterol, total 203 (H) 07/27/2020 11:22 AM    HDL Cholesterol 36 (L) 07/27/2020 11:22 AM    LDL, calculated 136 (H) 07/27/2020 11:22 AM    VLDL, calculated 31 07/27/2020 11:22 AM    Triglyceride 154 (H) 07/27/2020 11:22 AM       11/01/18   ECHO STRESS 11/01/2018 11/1/2018    Narrative · Stress echocardiogram is normal. Low risk study. · Baseline ECG: Normal sinus rhythm. · Negative stress electrocardiogram.        Signed by: Susan Graf MD       Impression and Plan:  Sarah Adam is a 48 y.o. male with:    1.) Atypical chest pain, likely esophageal etiology- resolved post dilation + PPI  2.) Heterozygous familial hyperlipidemia, had great/ expected goal with HI statin, continue current care, our ultimate goal will be LDL <70 but agree can hold off on +Zetia + PCSK9 inhibitor  3.) PreHTN, his SBP is 130s    1.) Continue Atorvastatin 40 qhs (high intensity)  2.) Recheck FLP in one year  3.) Aggressive risk factor modification discussed- includes more of a [place] type diet of more fresh fruits and vegetables/ less simple sugars and refined processed foods. Goal of at least 15-20 lbs at our next visit. Overall he is doing well. I am aware that his LDL bumped a bit out of goal on this year's check.   He admits he ran out of the atorvastatin a couple days prior to the check and I do think this can explain it for him. Ahead and refilled his atorvastatin. We will see him back in a year and I would like his lipids to be checked before then. Thank you for allowing me to participate in the care of your patient, please do not hesitate to call with questions or concerns.     Kindest Regards,    Gretchen Danielle, DO

## 2020-08-07 NOTE — PROGRESS NOTES
Benji Montague presents today for   Chief Complaint   Patient presents with   [address] preferred language for health care discussion is english/other. Is someone accompanying this pt? no    Is the patient using any DME equipment during [address]? no    Depression Screening:  3 most recent PHQ Screens 8/7/2020   Little interest or pleasure in doing things Not at all   Feeling down, depressed, irritable, or hopeless Not at all   Total Score PHQ 2 0       Learning Assessment:  Learning Assessment 8/1/2019   PRIMARY LEARNER Patient   HIGHEST LEVEL OF EDUCATION - PRIMARY LEARNER  GRADUATED HIGH SCHOOL OR GED   BARRIERS PRIMARY LEARNER NONE   CO-LEARNER CAREGIVER No   PRIMARY LANGUAGE ENGLISH    NEED -   LEARNER PREFERENCE PRIMARY READING     -   LEARNING SPECIAL TOPICS -   ANSWERED BY self   RELATIONSHIP SELF       Abuse Screening:  Abuse Screening Questionnaire 4/5/2019   Do you ever feel afraid of your partner? N   Are you in a relationship with someone who physically or mentally threatens you? N   Is it safe for you to go home? Y       Fall Risk  No flowsheet data found. Pt currently taking Anticoagulant therapy? no    Coordination of Care:  1. Have you been to the ER, urgent care clinic since your last visit? Hospitalized since your last visit? Yes ; urgent care for flu symptoms in 12/2019    2. Have you seen or consulted any other health care providers outside of the [address] [place] [address] since your last visit? Include any pap smears or colon screening.  N/a

## 2020-08-24 RX ORDER — ATORVASTATIN CALCIUM 40 MG/1
TABLET, FILM COATED ORAL
Qty: 30 TAB | Refills: 6 | Status: SHIPPED | OUTPATIENT
Start: 2020-08-24 | End: 2021-08-06 | Stop reason: SDUPTHER

## 2020-08-28 ENCOUNTER — APPOINTMENT (OUTPATIENT)
Dept: CT IMAGING | Age: 55
End: 2020-08-28
Attending: EMERGENCY MEDICINE
Payer: COMMERCIAL

## 2020-08-28 ENCOUNTER — HOSPITAL ENCOUNTER (EMERGENCY)
Age: 55
Discharge: HOME | End: 2020-08-28
Attending: EMERGENCY MEDICINE
Payer: COMMERCIAL

## 2020-08-28 ENCOUNTER — APPOINTMENT (OUTPATIENT)
Dept: GENERAL RADIOLOGY | Age: 55
End: 2020-08-28
Attending: EMERGENCY MEDICINE
Payer: COMMERCIAL

## 2020-08-28 VITALS
HEIGHT: 72 IN | HEART RATE: 69 BPM | WEIGHT: 250 LBS | TEMPERATURE: 97.7 F | DIASTOLIC BLOOD PRESSURE: 69 MMHG | RESPIRATION RATE: 14 BRPM | SYSTOLIC BLOOD PRESSURE: 114 MMHG | BODY MASS INDEX: 33.86 KG/M2 | OXYGEN SATURATION: 99 %

## 2020-08-28 DIAGNOSIS — K80.50 BILIARY COLIC: ICD-10-CM

## 2020-08-28 DIAGNOSIS — R10.13 ABDOMINAL PAIN, EPIGASTRIC: Primary | ICD-10-CM

## 2020-08-28 DIAGNOSIS — R07.9 CHEST PAIN, UNSPECIFIED TYPE: ICD-10-CM

## 2020-08-28 LAB
ALBUMIN SERPL-MCNC: 3.5 G/DL (ref 3.4–5)
ALBUMIN/GLOB SERPL: 1 {RATIO} (ref 0.8–1.7)
ALP SERPL-CCNC: 109 U/L (ref 45–117)
ALT SERPL-CCNC: 21 U/L (ref 16–61)
ANION GAP SERPL CALC-SCNC: 7 MMOL/L (ref 3–18)
AST SERPL-CCNC: 17 U/L (ref 10–38)
ATRIAL RATE: 65 BPM
BASOPHILS # BLD: 0.1 K/UL (ref 0–0.1)
BASOPHILS NFR BLD: 1 % (ref 0–2)
BILIRUB DIRECT SERPL-MCNC: 0.2 MG/DL (ref 0–0.2)
BILIRUB SERPL-MCNC: 0.3 MG/DL (ref 0.2–1)
BUN SERPL-MCNC: 18 MG/DL (ref 7–18)
BUN/CREAT SERPL: 14 (ref 12–20)
CALCIUM SERPL-MCNC: 8.8 MG/DL (ref 8.5–10.1)
CALCULATED P AXIS, ECG09: 35 DEGREES
CALCULATED T AXIS, ECG11: 20 DEGREES
CHLORIDE SERPL-SCNC: 105 MMOL/L (ref 100–111)
CK MB CFR SERPL CALC: NORMAL % (ref 0–4)
CK MB SERPL-MCNC: <1 NG/ML (ref 5–25)
CK SERPL-CCNC: 81 U/L (ref 39–308)
CO2 SERPL-SCNC: 27 MMOL/L (ref 21–32)
CREAT SERPL-MCNC: 1.27 MG/DL (ref 0.6–1.3)
D DIMER PPP FEU-MCNC: 0.28 UG/ML(FEU)
DIAGNOSIS, 93000: NORMAL
DIFFERENTIAL METHOD BLD: ABNORMAL
EOSINOPHIL # BLD: 0.2 K/UL (ref 0–0.4)
EOSINOPHIL NFR BLD: 2 % (ref 0–5)
ERYTHROCYTE [DISTWIDTH] IN BLOOD BY AUTOMATED COUNT: 12.8 % (ref 11.6–14.5)
GLOBULIN SER CALC-MCNC: 3.5 G/DL (ref 2–4)
GLUCOSE SERPL-MCNC: 124 MG/DL (ref 74–99)
HCT VFR BLD AUTO: 42.6 % (ref 36–48)
HGB BLD-MCNC: 14 G/DL (ref 13–16)
LACTATE BLD-SCNC: 1.3 MMOL/L (ref 0.4–2)
LIPASE SERPL-CCNC: 111 U/L (ref 73–393)
LYMPHOCYTES # BLD: 3.1 K/UL (ref 0.9–3.6)
LYMPHOCYTES NFR BLD: 29 % (ref 21–52)
MCH RBC QN AUTO: 28.7 PG (ref 24–34)
MCHC RBC AUTO-ENTMCNC: 32.9 G/DL (ref 31–37)
MCV RBC AUTO: 87.3 FL (ref 74–97)
MONOCYTES # BLD: 1.2 K/UL (ref 0.05–1.2)
MONOCYTES NFR BLD: 12 % (ref 3–10)
NEUTS SEG # BLD: 5.9 K/UL (ref 1.8–8)
NEUTS SEG NFR BLD: 56 % (ref 40–73)
P-R INTERVAL, ECG05: 152 MS
PLATELET # BLD AUTO: 306 K/UL (ref 135–420)
PMV BLD AUTO: 9.5 FL (ref 9.2–11.8)
POTASSIUM SERPL-SCNC: 4 MMOL/L (ref 3.5–5.5)
PROT SERPL-MCNC: 7 G/DL (ref 6.4–8.2)
Q-T INTERVAL, ECG07: 428 MS
QRS DURATION, ECG06: 90 MS
QTC CALCULATION (BEZET), ECG08: 445 MS
RBC # BLD AUTO: 4.88 M/UL (ref 4.7–5.5)
SODIUM SERPL-SCNC: 139 MMOL/L (ref 136–145)
TROPONIN I SERPL-MCNC: <0.02 NG/ML (ref 0–0.04)
TROPONIN I SERPL-MCNC: <0.02 NG/ML (ref 0–0.04)
VENTRICULAR RATE, ECG03: 65 BPM
WBC # BLD AUTO: 10.4 K/UL (ref 4.6–13.2)

## 2020-08-28 PROCEDURE — 99284 EMERGENCY DEPT VISIT MOD MDM: CPT

## 2020-08-28 PROCEDURE — 80048 BASIC METABOLIC PNL TOTAL CA: CPT

## 2020-08-28 PROCEDURE — 74011000258 HC RX REV CODE- 258: Performed by: EMERGENCY MEDICINE

## 2020-08-28 PROCEDURE — 85025 COMPLETE CBC W/AUTO DIFF WBC: CPT

## 2020-08-28 PROCEDURE — 93005 ELECTROCARDIOGRAM TRACING: CPT

## 2020-08-28 PROCEDURE — 85379 FIBRIN DEGRADATION QUANT: CPT

## 2020-08-28 PROCEDURE — 74011250637 HC RX REV CODE- 250/637: Performed by: EMERGENCY MEDICINE

## 2020-08-28 PROCEDURE — 82550 ASSAY OF CK (CPK): CPT

## 2020-08-28 PROCEDURE — 71045 X-RAY EXAM CHEST 1 VIEW: CPT

## 2020-08-28 PROCEDURE — 96374 THER/PROPH/DIAG INJ IV PUSH: CPT

## 2020-08-28 PROCEDURE — 96376 TX/PRO/DX INJ SAME DRUG ADON: CPT

## 2020-08-28 PROCEDURE — 80076 HEPATIC FUNCTION PANEL: CPT

## 2020-08-28 PROCEDURE — 74011000636 HC RX REV CODE- 636: Performed by: EMERGENCY MEDICINE

## 2020-08-28 PROCEDURE — 74177 CT ABD & PELVIS W/CONTRAST: CPT

## 2020-08-28 PROCEDURE — 96375 TX/PRO/DX INJ NEW DRUG ADDON: CPT

## 2020-08-28 PROCEDURE — 74011250636 HC RX REV CODE- 250/636: Performed by: EMERGENCY MEDICINE

## 2020-08-28 PROCEDURE — 83690 ASSAY OF LIPASE: CPT

## 2020-08-28 PROCEDURE — 83605 ASSAY OF LACTIC ACID: CPT

## 2020-08-28 RX ORDER — FAMOTIDINE 20 MG/1
20 TABLET, FILM COATED ORAL DAILY
Qty: 10 TAB | Refills: 0 | Status: SHIPPED | OUTPATIENT
Start: 2020-08-28 | End: 2020-09-07

## 2020-08-28 RX ORDER — MORPHINE SULFATE 4 MG/ML
4 INJECTION, SOLUTION INTRAMUSCULAR; INTRAVENOUS
Status: COMPLETED | OUTPATIENT
Start: 2020-08-28 | End: 2020-08-28

## 2020-08-28 RX ORDER — ONDANSETRON 2 MG/ML
4 INJECTION INTRAMUSCULAR; INTRAVENOUS
Status: COMPLETED | OUTPATIENT
Start: 2020-08-28 | End: 2020-08-28

## 2020-08-28 RX ORDER — HYDROCHLOROTHIAZIDE 12.5 MG/1
12.5 TABLET ORAL DAILY
Qty: 10 TAB | Refills: 0 | Status: SHIPPED | OUTPATIENT
Start: 2020-08-28 | End: 2020-09-07

## 2020-08-28 RX ORDER — MORPHINE SULFATE 4 MG/ML
4 INJECTION, SOLUTION INTRAMUSCULAR; INTRAVENOUS
Status: DISCONTINUED | OUTPATIENT
Start: 2020-08-28 | End: 2020-08-28

## 2020-08-28 RX ORDER — FAMOTIDINE 10 MG/ML
20 INJECTION INTRAVENOUS
Status: COMPLETED | OUTPATIENT
Start: 2020-08-28 | End: 2020-08-28

## 2020-08-28 RX ORDER — OXYCODONE AND ACETAMINOPHEN 5; 325 MG/1; MG/1
1-2 TABLET ORAL
Qty: 16 TAB | Refills: 0 | Status: SHIPPED | OUTPATIENT
Start: 2020-08-28 | End: 2020-09-04

## 2020-08-28 RX ORDER — MORPHINE SULFATE 2 MG/ML
2 INJECTION, SOLUTION INTRAMUSCULAR; INTRAVENOUS
Status: COMPLETED | OUTPATIENT
Start: 2020-08-28 | End: 2020-08-28

## 2020-08-28 RX ORDER — GUAIFENESIN 100 MG/5ML
81 LIQUID (ML) ORAL
Status: COMPLETED | OUTPATIENT
Start: 2020-08-28 | End: 2020-08-28

## 2020-08-28 RX ORDER — SODIUM CHLORIDE 9 MG/ML
1000 INJECTION, SOLUTION INTRAVENOUS ONCE
Status: COMPLETED | OUTPATIENT
Start: 2020-08-28 | End: 2020-08-28

## 2020-08-28 RX ADMIN — ONDANSETRON 4 MG: 2 INJECTION INTRAMUSCULAR; INTRAVENOUS at 02:54

## 2020-08-28 RX ADMIN — NITROGLYCERIN 0.5 INCH: 20 OINTMENT TOPICAL at 02:54

## 2020-08-28 RX ADMIN — MORPHINE SULFATE 2 MG: 2 INJECTION, SOLUTION INTRAMUSCULAR; INTRAVENOUS at 04:20

## 2020-08-28 RX ADMIN — SODIUM CHLORIDE 10 MG: 900 INJECTION, SOLUTION INTRAVENOUS at 04:20

## 2020-08-28 RX ADMIN — IOPAMIDOL 95 ML: 612 INJECTION, SOLUTION INTRAVENOUS at 03:56

## 2020-08-28 RX ADMIN — MORPHINE SULFATE 4 MG: 4 INJECTION, SOLUTION INTRAMUSCULAR; INTRAVENOUS at 02:54

## 2020-08-28 RX ADMIN — ASPIRIN 81 MG CHEWABLE TABLET 81 MG: 81 TABLET CHEWABLE at 02:54

## 2020-08-28 RX ADMIN — SODIUM CHLORIDE 1000 ML: 900 INJECTION, SOLUTION INTRAVENOUS at 03:02

## 2020-08-28 RX ADMIN — MORPHINE SULFATE 4 MG: 4 INJECTION, SOLUTION INTRAMUSCULAR; INTRAVENOUS at 03:13

## 2020-08-28 RX ADMIN — FAMOTIDINE 20 MG: 10 INJECTION INTRAVENOUS at 02:54

## 2020-08-28 NOTE — ED NOTES
6:22 AM  08/28/20     Discharge instructions given to Dilip Reyes (name) with verbalization of understanding. Patient accompanied by souse. Patient discharged with the following prescriptions hctz, pepcid, percocet. Patient discharged to home (destination).       Alberto Head

## 2020-08-28 NOTE — DISCHARGE INSTRUCTIONS
Return for pain, fever not resolving with motrin or tylenol, shortness of breath, vomiting, decreased fluid intake, weakness, numbness, dizziness, or any change or concerns. Patient Education        Biliary Colic: Care Instructions  Your Care Instructions     Biliary (say \"BILL-ee-air-ee\") colic is belly pain caused by gallbladder problems. It is usually caused by a gallstone moving through or blocking the common bile duct or cystic duct. Gallstones are stones that form in the gallbladder. They are made of cholesterol and other substances. The gallbladder is a small sac located just under the liver. It stores bile released by the liver. Bile helps you digest fats. Gallstones also can form in the common bile duct or cystic duct. These ducts carry bile from the gallbladder and the liver to the small intestine. Gallstones may be as small as a grain of sand or as large as a golf ball. Gallstones that cause severe symptoms usually are treated with surgery to remove the gallbladder. If the first attack of biliary colic is mild, it is often safe to wait until you have had another attack before you think about having surgery. The doctor has checked you carefully, but problems can develop later. If you notice any problems or new symptoms, get medical treatment right away. Follow-up care is a key part of your treatment and safety. Be sure to make and go to all appointments, and call your doctor if you are having problems. It's also a good idea to know your test results and keep a list of the medicines you take. How can you care for yourself at home? · Take pain medicines exactly as directed. ? If the doctor gave you a prescription medicine for pain, take it as prescribed. ? If you are not taking a prescription pain medicine, ask your doctor if you can take an over-the-counter medicine. Read and follow all instructions on the label. · Avoid foods that cause symptoms, especially fatty foods.  These can cause biliary colic. · You may need more tests to look at your gallbladder. When should you call for help? Call your doctor now or seek immediate medical care if:  · You have a fever. · You have new belly pain, or your pain gets worse. · There is a new or increasing yellow tint to your skin or the whites of your eyes. · Your urine is dark yellow-brown, or your stools are light-colored or white. · You cannot keep down fluids. Watch closely for changes in your health, and be sure to contact your doctor if:  · You do not get better as expected. · You are not getting better after 1 day (24 hours). Where can you learn more? Go to http://swapnil-otilia.info/  Enter [id number] in the search box to learn more about \"Biliary Colic: Care Instructions. \"  Current as of: August 12, 2019               Content Version: 12.5  © 3472-5046 [employer]. Care instructions adapted under license by [employer] (which disclaims liability or warranty for this information). If you have questions about a medical condition or this instruction, always ask your healthcare professional. [place] [address] any warranty or liability for your use of this information. Patient Education        Abdominal Pain: Care Instructions  Your Care Instructions     Abdominal pain has many possible causes. Some aren't serious and get better on their own in a few days. Others need more testing and treatment. If your pain continues or gets worse, you need to be rechecked and may need more tests to find out what is wrong. You may need surgery to correct the problem. Don't ignore new symptoms, such as fever, nausea and vomiting, urination problems, pain that gets worse, and dizziness. These may be signs of a more serious problem. Your doctor may have recommended a follow-up visit in the next 8 to 12 hours. If you are not getting better, you may need more tests or treatment.   The doctor has checked you carefully, but problems can develop later. If you notice any problems or new symptoms, get medical treatment right away. Follow-up care is a key part of your treatment and safety. Be sure to make and go to all appointments, and call your doctor if you are having problems. It's also a good idea to know your test results and keep a list of the medicines you take. How can you care for yourself at home? · Rest until you feel better. · To prevent dehydration, drink plenty of fluids, enough so that your urine is light yellow or clear like water. Choose water and other caffeine-free clear liquids until you feel better. If you have kidney, heart, or liver disease and have to limit fluids, talk with your doctor before you increase the amount of fluids you drink. · If your stomach is upset, eat mild foods, such as rice, dry toast or crackers, bananas, and applesauce. Try eating several small meals instead of two or three large ones. · Wait until 48 hours after all symptoms have gone away before you have spicy foods, alcohol, and drinks that contain caffeine. · Do not eat foods that are high in fat. · Avoid anti-inflammatory medicines such as aspirin, ibuprofen (Advil, Motrin), and naproxen (Aleve). These can cause stomach upset. Talk to your doctor if you take daily aspirin for another health problem. When should you call for help? [id number] anytime you think you may need emergency care. For example, call if:  · You passed out (lost consciousness). · You pass maroon or very bloody stools. · You vomit blood or what looks like coffee grounds. · You have new, severe belly pain. Call your doctor now or seek immediate medical care if:  · Your pain gets worse, especially if it becomes focused in one area of your belly. · You have a new or higher fever. · Your stools are black and look like tar, or they have streaks of blood. · You have unexpected vaginal bleeding.   · You have symptoms of a urinary tract infection. These may include:  ? Pain when you urinate. ? Urinating more often than usual.  ? Blood in your urine. · You are dizzy or lightheaded, or you feel like you may faint. Watch closely for changes in your health, and be sure to contact your doctor if:  · You are not getting better after 1 day (24 hours). Where can you learn more? Go to http://www.gray.com/  Enter F311 in the search box to learn more about \"Abdominal Pain: Care Instructions. \"  Current as of: June 26, 2019               Content Version: 12.5  © 1329-8042 [employer]. Care instructions adapted under license by [employer] (which disclaims liability or warranty for this information). If you have questions about a medical condition or this instruction, always ask your healthcare professional. [address] any warranty or liability for your use of this information. Patient Education        Chest Pain: Care Instructions  Your Care Instructions     There are many things that can cause chest pain. Some are not serious and will get better on their own in a few days. But some kinds of chest pain need more testing and treatment. Your doctor may have recommended a follow-up visit in the next 8 to 12 hours. If you are not getting better, you may need more tests or treatment. Even though your doctor has released you, you still need to watch for any problems. The doctor carefully checked you, but sometimes problems can develop later. If you have new symptoms or if your symptoms do not get better, get medical care right away. If you have worse or different chest pain or pressure that lasts more than 5 minutes or you passed out (lost consciousness), [id number] or seek other emergency help right away. A medical visit is only one step in your treatment.  Even if you feel better, you still need to do what your doctor recommends, such as going to all suggested follow-up appointments and taking medicines exactly as directed. This will help you recover and help prevent future problems. How can you care for yourself at home? · Rest until you feel better. · Take your medicine exactly as prescribed. Call your doctor if you think you are having a problem with your medicine. · Do not drive after taking a prescription pain medicine. When should you call for help? [id number]:   · You passed out (lost consciousness). · You have severe difficulty breathing. · You have symptoms of a heart attack. These may include:  ? Chest pain or pressure, or a strange feeling in your chest.  ? Sweating. ? Shortness of breath. ? Nausea or vomiting. ? Pain, pressure, or a strange feeling in your back, neck, jaw, or upper belly or in one or both shoulders or arms. ? Lightheadedness or sudden weakness. ? A fast or irregular heartbeat. After you call 911, the  may tell you to chew 1 adult-strength or 2 to 4 low-dose aspirin. Wait for an ambulance. Do not try to drive yourself. Call your doctor today if:   · You have any trouble breathing. · Your chest pain gets worse. · You are dizzy or lightheaded, or you feel like you may faint. · You are not getting better as expected. · You are having new or different chest pain. Where can you learn more? Go to http://swapnil-otilia.info/  Enter A120 in the search box to learn more about \"Chest Pain: Care Instructions. \"  Current as of: June 26, 2019               Content Version: 12.5  © 7559-0520 [employer]. Care instructions adapted under license by [employer] (which disclaims liability or warranty for this information). If you have questions about a medical condition or this instruction, always ask your healthcare professional. [address] any warranty or liability for your use of this information.

## 2020-08-28 NOTE — ED PROVIDER NOTES
Pt /co mid upper abd pain/lower chest, gradual onset, few hours pta. H/o same for months but getting much worse over last few weeks. No sob.  + n/v x1. No radiation of pain no leg pain or swleling. No meds taken for pain pta. Pain constant/severe, burning. No swelling. No stool changes. Pain 10/10. Says no h/o cad. H/o severe reflux. Past Medical History:   Diagnosis Date    GERD (gastroesophageal reflux disease)     Hypercholesterolemia        Past Surgical History:   Procedure Laterality Date    HX ORTHOPAEDIC      ankle, arm    HX WISDOM TEETH EXTRACTION           Family History:   Problem Relation Age of Onset    Diabetes Father     Heart Attack Father 61    Stroke Father 64    Hypertension Father    [address] [facility] Arthritis-rheumatoid Mother     Dementia Maternal Grandmother     Heart Disease Maternal Grandfather 54       Social History     Socioeconomic History    Marital status:      Spouse name: Not on file    Number of children: Not on file    Years of education: Not on file    Highest education level: Not on file   Occupational History    Not on file   Social Needs    Financial resource strain: Not on file    Food insecurity     Worry: Not on file     Inability: Not on file    Transportation needs     Medical: Not on file     Non-medical: Not on file   Tobacco Use    Smoking status: Never Smoker    Smokeless tobacco: Never Used   Substance and Sexual Activity    Alcohol use:  Yes    Drug use: No    Sexual activity: Not on file   Lifestyle    Physical activity     Days per week: Not on file     Minutes per session: Not on file    Stress: Not on file   Relationships    Social connections     Talks on phone: Not on file     Gets together: Not on file     Attends Muslim service: Not on file     Active member of club or organization: Not on file     Attends meetings of clubs or organizations: Not on file     Relationship status: Not on file    Intimate partner violence Fear of current or ex partner: Not on file     Emotionally abused: Not on file     Physically abused: Not on file     Forced sexual activity: Not on file   Other Topics Concern    Not on file   Social History Narrative    Not on file         ALLERGIES: Pcn [penicillins] and Statins-hmg-coa reductase inhibitors    Review of Systems   Constitutional: Negative for diaphoresis and fever. HENT: Negative for congestion. Respiratory: Negative for cough and shortness of breath. Cardiovascular: Positive for chest pain. Gastrointestinal: Positive for abdominal pain, nausea and vomiting. Musculoskeletal: Negative for back pain. Skin: Negative for rash. Neurological: Negative for dizziness. All other systems reviewed and are negative. Vitals:    08/28/20 0249 08/28/20 0420 08/28/20 0500 08/28/20 0600   BP: (!) 161/101 (!) 158/92 141/87 114/69   Pulse: 67 76 74 69   Resp: 22 16 17 14   Temp: 97.7 °F (36.5 °C)      SpO2: 100% 100% 100% 99%   Weight: 113.4 kg (250 lb)      Height: 6' (1.829 m)               Physical Exam  Vitals signs and nursing note reviewed. Constitutional:       General: He is in acute distress. Appearance: He is well-developed. HENT:      Head: Normocephalic and atraumatic. Eyes:      Conjunctiva/sclera: Conjunctivae normal.   Neck:      Musculoskeletal: Normal range of motion. Cardiovascular:      Rate and Rhythm: Normal rate and regular rhythm. Pulmonary:      Effort: Pulmonary effort is normal.      Breath sounds: No wheezing. Abdominal:      Palpations: Abdomen is soft. Tenderness: There is abdominal tenderness (superior epigastric ttp). Comments: Neg manley's  Pt's pain reproducible at sup epigastrium. Musculoskeletal:         General: No tenderness. Skin:     General: Skin is warm and dry. Capillary Refill: Capillary refill takes less than 2 seconds. Findings: No rash.    Neurological:      Mental Status: He is alert and oriented to person, place, and time. MDM       Procedures      Vitals:  No data found. Medications ordered:   Medications   0.9% sodium chloride infusion 1,000 mL (0 mL IntraVENous IV Completed 8/28/20 0400)   morphine injection 4 mg (4 mg IntraVENous Given 8/28/20 0254)   ondansetron (ZOFRAN) injection 4 mg (4 mg IntraVENous Given 8/28/20 0254)   famotidine (PF) (PEPCID) injection 20 mg (20 mg IntraVENous Given 8/28/20 0254)   aspirin chewable tablet 81 mg (81 mg Oral Given 8/28/20 0254)   nitroglycerin (NITROBID) 2 % ointment 0.5 Inch (0.5 Inches Topical Given 8/28/20 0254)   morphine injection 4 mg (4 mg IntraVENous Given 8/28/20 0313)   metoclopramide HCl (REGLAN) 10 mg in 0.9% sodium chloride 50 mL IVPB (10 mg IntraVENous Given 8/28/20 0420)   iopamidoL (ISOVUE 300) 61 % contrast injection 80 mL (95 mL IntraVENous Given 8/28/20 0356)   morphine injection 2 mg (2 mg IntraVENous Given 8/28/20 0420)         Lab findings:  No results found for this or any previous visit (from the past 12 hour(s)). X-Ray, CT or other radiology findings or impressions:  CT ABD PELV W CONT   Final Result   IMPRESSION:      1. No acute findings along the gastrointestinal tract. 2.  Cholelithiasis. XR CHEST PORT   Final Result   IMPRESSION:      No acute findings. Progress notes, Consult notes or additional Procedure notes:   Pain cont, cta ordered, repeat pain meds, pain is mid upper abd  6:12 AM pt pain free, abd soft and non-tender, no chest pain, says comes after meals worsening over last few weeks, had lg fettucine cole for dinner prior to pain onset. No emc. No acute ekg changes. Neg trop 2, pt prefers dc at this time. To dc w detailed ret inst given. Pt verb und and agrees w dc plan. Diagnosis:   1. Abdominal pain, epigastric    2. Chest pain, unspecified type    3.  Biliary colic        Disposition: home    Follow-up Information     Follow up With Specialties Details Why Contact Mariel Tyler [facility], DO General Surgery Call today  [id number] [address] [id number]  [phone] [address] [facility] EMERGENCY DEPT Emergency Medicine Go to As needed, If symptoms worsen [address] [facility]    Ashley Thompson MD Family Medicine Schedule an appointment as soon as possible for a visit in 2 days  [address] [facility]  [phone]             Discharge Medication List as of 8/28/2020  6:12 AM      START taking these medications    Details   famotidine (Pepcid) 20 mg tablet Take 1 Tab by mouth daily for 10 days. , Normal, Disp-10 Tab,R-0      oxyCODONE-acetaminophen (Percocet) 5-325 mg per tablet Take 1-2 Tabs by mouth every six (6) hours as needed for Pain for up to 7 days. Max Daily Amount: 8 Tabs., Normal, Disp-16 Tab,R-0      hydroCHLOROthiazide (HYDRODIURIL) 12.5 mg tablet Take 1 Tab by mouth daily for 10 days. , Normal, Disp-10 Tab,R-0         CONTINUE these medications which have NOT CHANGED    Details   atorvastatin (LIPITOR) 40 mg tablet TAKE ONE TABLET BY MOUTH ONCE DAILY, Normal, Disp-30 Tab,R-6      omeprazole (PRILOSEC) 40 mg capsule Take 1 Cap by mouth daily. , Historical Med      cetirizine (ZYRTEC) 10 mg tablet Take 10 mg by mouth as needed., Historical Med

## 2020-09-11 ENCOUNTER — OFFICE VISIT (OUTPATIENT)
Dept: SURGERY | Age: 55
End: 2020-09-11

## 2020-09-11 VITALS
HEIGHT: 72 IN | RESPIRATION RATE: 20 BRPM | DIASTOLIC BLOOD PRESSURE: 84 MMHG | WEIGHT: 258 LBS | TEMPERATURE: 97.2 F | HEART RATE: 88 BPM | BODY MASS INDEX: 34.95 KG/M2 | SYSTOLIC BLOOD PRESSURE: 130 MMHG

## 2020-09-11 DIAGNOSIS — K80.10 CHRONIC CHOLECYSTITIS WITH CALCULUS: Primary | ICD-10-CM

## 2020-09-11 NOTE — PROGRESS NOTES
Consult    Patient: Deeann Phalen MRN: [id number]  SSN: [id number]    YOB: 1965  Age: 48 y.o. Sex: male      Subjective:      Deeann Phalen is a 48 y. o. white male who presents with a 2-year history of occasional episodic epigastric pain occurring approximately every 6 months without radiation with associated nausea and bloating lasting approximately 5 to 6 hours prior to spontaneous resolution. The patient however the last month has noted an increase in frequency as well as intensity and is being seen in the emergency department on several occasions in this regard with a work-up demonstrating cholelithiasis the patient notes a probable association with the ingestion of fatty foods in regard to precipitation of symptoms and denies acholic stools bilirubinuria and jaundice  Allergies: Penicillin  Current medications: See medication list  Past medical history  1. Clinically severe obesity with body mass index of 35 and obese related comorbidities of gastroesophageal reflux disease, hypercholesterolemia  2. Allergic rhinitis  Past surgical history:  1. Open reduction internal fixation right elbow fracture approximately 6 grade  2. Open reduction internal fixation right ankle fracture age 26  3 wisdom tooth extractions  Social history: Denies utilization with tobacco and alcohol  Family history: Mother  74-pancreatic cancer  Father  70-coronary disease  No siblings    Allergies   Allergen Reactions    Pcn [Penicillins] Unknown (comments)    Statins-Hmg-Coa Reductase Inhibitors Other (comments)     Generalize fatigue and body ache. Probably taken simvastatin. Current Outpatient Medications   Medication Sig Dispense Refill    atorvastatin (LIPITOR) 40 mg tablet TAKE ONE TABLET BY MOUTH ONCE DAILY 30 Tab 6    omeprazole (PRILOSEC) 40 mg capsule Take 1 Cap by mouth daily.  cetirizine (ZYRTEC) 10 mg tablet Take 10 mg by mouth as needed.        Past Medical History:   Diagnosis Date    GERD (gastroesophageal reflux disease)     Hypercholesterolemia      Past Surgical History:   Procedure Laterality Date    HX COLONOSCOPY      HX ORTHOPAEDIC      ankle right fx screw, right elbow dislocation pins reconstruction done    HX WISDOM TEETH EXTRACTION        Social History     Tobacco Use    Smoking status: Never Smoker    Smokeless tobacco: Never Used   Substance Use Topics    Alcohol use: Yes     Frequency: Monthly or less     Family History   Problem Relation Age of Onset    Diabetes Father     Heart Attack Father 61    Stroke Father 64    Hypertension Father    [facility] Arthritis-rheumatoid Mother     Dementia Maternal Grandmother     Heart Disease Maternal Grandfather 54           Review of Systems:    General: Denies fevers, chills, night sweats, fatigue, weight loss, or weight gain. HEENT: Denies changes in auditory or visual acuity, recurrent pharyngitis, epistaxis, chronic rhinorrhea, vertigo    Respiratory: Denies increasing shortness of breath, productive cough, hemoptysis    Cardiac: Denies known history of cardiac disease, heart murmur, palpitations    GI: Denies dysphagia, recurrent emesis, hematemesis, changes in bowel habits, hematochezia, melena    : Denies hematuria frequency urgency dysuria    Musculoskeletal: Denies fractures, dislocations    Neurologic: Denies history of CVA, paralysis paresthesias, recurrent cephalgia, seizures    Endocrine: Denies polyuria, polydipsia, polyphagia, heat and cold intolerance    Lymph/heme: Denies a history of malignancy, anemia, bruising, blood transfusions    Integumentary: Negative for dermatitis     Objective:     Vitals:    09/11/20 0930   BP: 130/84   Pulse: 88   Resp: 20   Temp: 97.2 °F (36.2 °C)   Weight: 117 kg (258 lb)   Height: 6' (1.829 m)        General: no acute distress, nontoxic in appearance.   Head: Normocephalic, atraumatic  Mouth: Clear, no overt lesions, oral mucosa is pink and moist.  Neck: Supple, no masses, no adenopathy or carotid bruits, trachea midline  Resp: Clear to auscultation bilaterally, no wheezing, rhonchi, or rales, excursions normal and symmetrical.  Cardio: Regular rate and rhythm, no murmurs, clicks, gallops, or rubs. Abdomen: Obese, soft, nontender, nondistended, normoactive bowel sounds, no hernias. Extremities: Warm, well perfused, no tenderness or swelling, normal gait/station, without edema or varicosities  Neuro: Sensation and strength grossly intact and symmetrical.  Psych: Alert and oriented to person, place, and time.     August 20, 2020 CBC, BMP, hepatic profile, troponin-normal  August 28, 2020 chest x-ray: No active disease  August 28, 2020 EKG-normal sinus rhythm without the ability to rule out anterior infarct which a troponin profile was obtained and was unremarkable  August 28, 2020 abdominal/pelvic CT scan multiple gallstones largest measuring 2.7 cm otherwise normal study    Assessment:     Symptomatic chronic calculus cholecystitis    Plan:     Discussed options for management with the patient to contact definitive surgical intervention of left operatively open cholecystectomy with intraoperative cholangiography as definitive treatment the risk benefits of operating alternatives and potential complications were discussed with patient with understanding wish to proceed we will proceed with scheduling the procedure as an outpatient at [facility] with 2-week postoperative office evaluation    Signed By: Manjit Toledo DO     September 11, 2020

## 2020-09-11 NOTE — H&P (VIEW-ONLY)
Consult Patient: Diana Traylor MRN: [id number]  SSN: [id number] YOB: 1965  Age: 48 y.o. Sex: male Subjective:  
  
Diana Traylor is a 48 y. o. white male who presents with a 2-year history of occasional episodic epigastric pain occurring approximately every 6 months without radiation with associated nausea and bloating lasting approximately 5 to 6 hours prior to spontaneous resolution. The patient however the last month has noted an increase in frequency as well as intensity and is being seen in the emergency department on several occasions in this regard with a work-up demonstrating cholelithiasis the patient notes a probable association with the ingestion of fatty foods in regard to precipitation of symptoms and denies acholic stools bilirubinuria and jaundice Allergies: Penicillin Current medications: See medication list 
Past medical history 1. Clinically severe obesity with body mass index of 35 and obese related comorbidities of gastroesophageal reflux disease, hypercholesterolemia 2. Allergic rhinitis Past surgical history: 1. Open reduction internal fixation right elbow fracture approximately 6 grade 2. Open reduction internal fixation right ankle fracture age 26 
3 wisdom tooth extractions Social history: Denies utilization with tobacco and alcohol Family history: Mother  68pancreatic cancer Father  70coronary disease No siblings Allergies Allergen Reactions  Pcn [Penicillins] Unknown (comments)  Statins-Hmg-Coa Reductase Inhibitors Other (comments) Generalize fatigue and body ache. Probably taken simvastatin. Current Outpatient Medications Medication Sig Dispense Refill  atorvastatin (LIPITOR) 40 mg tablet TAKE ONE TABLET BY MOUTH ONCE DAILY 30 Tab 6  
 omeprazole (PRILOSEC) 40 mg capsule Take 1 Cap by mouth daily.  cetirizine (ZYRTEC) 10 mg tablet Take 10 mg by mouth as needed. Past Medical History:  
Diagnosis Date  GERD (gastroesophageal reflux disease)  Hypercholesterolemia Past Surgical History:  
Procedure Laterality Date  HX COLONOSCOPY    
 HX ORTHOPAEDIC    
 ankle right fx screw, right elbow dislocation pins reconstruction done  HX WISDOM TEETH EXTRACTION Social History Tobacco Use  Smoking status: Never Smoker  Smokeless tobacco: Never Used Substance Use Topics  Alcohol use: Yes Frequency: Monthly or less Family History Problem Relation Age of Onset  Diabetes Father  Heart Attack Father 61  Stroke Father 64  Hypertension Father  Arthritis-rheumatoid Mother  Dementia Maternal Grandmother  Heart Disease Maternal Grandfather 54 Review of Systems: 
 
General: Denies fevers, chills, night sweats, fatigue, weight loss, or weight gain. HEENT: Denies changes in auditory or visual acuity, recurrent pharyngitis, epistaxis, chronic rhinorrhea, vertigo Respiratory: Denies increasing shortness of breath, productive cough, hemoptysis Cardiac: Denies known history of cardiac disease, heart murmur, palpitations GI: Denies dysphagia, recurrent emesis, hematemesis, changes in bowel habits, hematochezia, melena : Denies hematuria frequency urgency dysuria Musculoskeletal: Denies fractures, dislocations Neurologic: Denies history of CVA, paralysis paresthesias, recurrent cephalgia, seizures Endocrine: Denies polyuria, polydipsia, polyphagia, heat and cold intolerance Lymph/heme: Denies a history of malignancy, anemia, bruising, blood transfusions Integumentary: Negative for dermatitis Objective:  
 
Vitals:  
 09/11/20 0930 BP: 130/84 Pulse: 88 Resp: 20 Temp: 97.2 °F (36.2 °C) Weight: 117 kg (258 lb) Height: 6' (1.829 m) General: no acute distress, nontoxic in appearance. Head: Normocephalic, atraumatic Mouth: Clear, no overt lesions, oral mucosa is pink and moist. 
Neck: Supple, no masses, no adenopathy or carotid bruits, trachea midline Resp: Clear to auscultation bilaterally, no wheezing, rhonchi, or rales, excursions normal and symmetrical. 
Cardio: Regular rate and rhythm, no murmurs, clicks, gallops, or rubs. Abdomen: Obese, soft, nontender, nondistended, normoactive bowel sounds, no hernias. Extremities: Warm, well perfused, no tenderness or swelling, normal gait/station, without edema or varicosities Neuro: Sensation and strength grossly intact and symmetrical. 
Psych: Alert and oriented to person, place, and time. August 20, 2020 CBC, BMP, hepatic profile, troponinnormal 
August 28, 2020 chest x-ray: No active disease August 28, 2020 EKGnormal sinus rhythm without the ability to rule out anterior infarct which a troponin profile was obtained and was unremarkable August 28, 2020 abdominal/pelvic CT scan multiple gallstones largest measuring 2.7 cm otherwise normal study Assessment:  
 
Symptomatic chronic calculus cholecystitis Plan:  
 
Discussed options for management with the patient to contact definitive surgical intervention of left operatively open cholecystectomy with intraoperative cholangiography as definitive treatment the risk benefits of operating alternatives and potential complications were discussed with patient with understanding wish to proceed we will proceed with scheduling the procedure as an outpatient at [facility] with 2-week postoperative office evaluation Signed By: Emmie Cordero DO September 11, 2020

## 2020-09-13 ENCOUNTER — APPOINTMENT (OUTPATIENT)
Dept: ULTRASOUND IMAGING | Age: 55
End: 2020-09-13
Attending: EMERGENCY MEDICINE
Payer: COMMERCIAL

## 2020-09-13 ENCOUNTER — ANESTHESIA (OUTPATIENT)
Dept: SURGERY | Age: 55
End: 2020-09-13
Payer: COMMERCIAL

## 2020-09-13 ENCOUNTER — APPOINTMENT (OUTPATIENT)
Dept: GENERAL RADIOLOGY | Age: 55
End: 2020-09-13
Attending: SURGERY
Payer: COMMERCIAL

## 2020-09-13 ENCOUNTER — ANESTHESIA EVENT (OUTPATIENT)
Dept: SURGERY | Age: 55
End: 2020-09-13
Payer: COMMERCIAL

## 2020-09-13 ENCOUNTER — HOSPITAL ENCOUNTER (OUTPATIENT)
Age: 55
Setting detail: OBSERVATION
Discharge: HOME | End: 2020-09-13
Attending: EMERGENCY MEDICINE | Admitting: SURGERY
Payer: COMMERCIAL

## 2020-09-13 VITALS
HEART RATE: 84 BPM | SYSTOLIC BLOOD PRESSURE: 122 MMHG | DIASTOLIC BLOOD PRESSURE: 81 MMHG | RESPIRATION RATE: 14 BRPM | TEMPERATURE: 98.5 F | OXYGEN SATURATION: 100 %

## 2020-09-13 DIAGNOSIS — Z90.49 STATUS POST LAPAROSCOPIC CHOLECYSTECTOMY: Primary | ICD-10-CM

## 2020-09-13 PROBLEM — K80.50 BILIARY COLIC: Status: ACTIVE | Noted: 2020-09-13

## 2020-09-13 LAB
ALBUMIN SERPL-MCNC: 3.7 G/DL (ref 3.4–5)
ALBUMIN/GLOB SERPL: 1 {RATIO} (ref 0.8–1.7)
ALP SERPL-CCNC: 109 U/L (ref 45–117)
ALT SERPL-CCNC: 23 U/L (ref 16–61)
ANION GAP SERPL CALC-SCNC: 4 MMOL/L (ref 3–18)
AST SERPL-CCNC: 22 U/L (ref 10–38)
ATRIAL RATE: 65 BPM
BASOPHILS # BLD: 0 K/UL (ref 0–0.1)
BASOPHILS NFR BLD: 0 % (ref 0–2)
BILIRUB SERPL-MCNC: 0.5 MG/DL (ref 0.2–1)
BUN SERPL-MCNC: 22 MG/DL (ref 7–18)
BUN/CREAT SERPL: 16 (ref 12–20)
CALCIUM SERPL-MCNC: 9.1 MG/DL (ref 8.5–10.1)
CALCULATED P AXIS, ECG09: -5 DEGREES
CALCULATED R AXIS, ECG10: -13 DEGREES
CALCULATED T AXIS, ECG11: 0 DEGREES
CHLORIDE SERPL-SCNC: 108 MMOL/L (ref 100–111)
CK MB CFR SERPL CALC: NORMAL % (ref 0–4)
CK MB CFR SERPL CALC: NORMAL % (ref 0–4)
CK MB SERPL-MCNC: <1 NG/ML (ref 5–25)
CK MB SERPL-MCNC: <1 NG/ML (ref 5–25)
CK SERPL-CCNC: 100 U/L (ref 39–308)
CK SERPL-CCNC: 121 U/L (ref 39–308)
CO2 SERPL-SCNC: 28 MMOL/L (ref 21–32)
COVID-19 RAPID TEST, COVR: NOT DETECTED
CREAT SERPL-MCNC: 1.35 MG/DL (ref 0.6–1.3)
DIAGNOSIS, 93000: NORMAL
DIFFERENTIAL METHOD BLD: ABNORMAL
EOSINOPHIL # BLD: 0 K/UL (ref 0–0.4)
EOSINOPHIL NFR BLD: 0 % (ref 0–5)
ERYTHROCYTE [DISTWIDTH] IN BLOOD BY AUTOMATED COUNT: 12.4 % (ref 11.6–14.5)
GLOBULIN SER CALC-MCNC: 3.8 G/DL (ref 2–4)
GLUCOSE SERPL-MCNC: 125 MG/DL (ref 74–99)
HCT VFR BLD AUTO: 41.5 % (ref 36–48)
HGB BLD-MCNC: 14.3 G/DL (ref 13–16)
LACTATE BLD-SCNC: 0.97 MMOL/L (ref 0.4–2)
LIPASE SERPL-CCNC: 80 U/L (ref 73–393)
LYMPHOCYTES # BLD: 1.2 K/UL (ref 0.9–3.6)
LYMPHOCYTES NFR BLD: 12 % (ref 21–52)
MCH RBC QN AUTO: 29.2 PG (ref 24–34)
MCHC RBC AUTO-ENTMCNC: 34.5 G/DL (ref 31–37)
MCV RBC AUTO: 84.7 FL (ref 74–97)
MONOCYTES # BLD: 0.4 K/UL (ref 0.05–1.2)
MONOCYTES NFR BLD: 4 % (ref 3–10)
NEUTS SEG # BLD: 8.2 K/UL (ref 1.8–8)
NEUTS SEG NFR BLD: 84 % (ref 40–73)
P-R INTERVAL, ECG05: 140 MS
PLATELET # BLD AUTO: 311 K/UL (ref 135–420)
PMV BLD AUTO: 9.6 FL (ref 9.2–11.8)
POTASSIUM SERPL-SCNC: 4.2 MMOL/L (ref 3.5–5.5)
PROT SERPL-MCNC: 7.5 G/DL (ref 6.4–8.2)
Q-T INTERVAL, ECG07: 434 MS
QRS DURATION, ECG06: 100 MS
QTC CALCULATION (BEZET), ECG08: 451 MS
RBC # BLD AUTO: 4.9 M/UL (ref 4.7–5.5)
SODIUM SERPL-SCNC: 140 MMOL/L (ref 136–145)
SOURCE, COVRS: NORMAL
SPECIMEN TYPE, XMCV1T: NORMAL
TROPONIN I SERPL-MCNC: <0.02 NG/ML (ref 0–0.04)
TROPONIN I SERPL-MCNC: <0.02 NG/ML (ref 0–0.04)
VENTRICULAR RATE, ECG03: 65 BPM
WBC # BLD AUTO: 10 K/UL (ref 4.6–13.2)

## 2020-09-13 PROCEDURE — 76210000026 HC REC RM PH II 1 TO 1.5 HR: Performed by: SURGERY

## 2020-09-13 PROCEDURE — 77030002933 HC SUT MCRYL J&J -A: Performed by: SURGERY

## 2020-09-13 PROCEDURE — 99218 HC RM OBSERVATION: CPT

## 2020-09-13 PROCEDURE — 83605 ASSAY OF LACTIC ACID: CPT

## 2020-09-13 PROCEDURE — 82550 ASSAY OF CK (CPK): CPT

## 2020-09-13 PROCEDURE — 77030010515 HC APPL ENDOCLP LIG J&J -B: Performed by: SURGERY

## 2020-09-13 PROCEDURE — 74300 X-RAY BILE DUCTS/PANCREAS: CPT

## 2020-09-13 PROCEDURE — 77030026438 HC STYL ET INTUB CARD -A: Performed by: ANESTHESIOLOGY

## 2020-09-13 PROCEDURE — 74011250636 HC RX REV CODE- 250/636

## 2020-09-13 PROCEDURE — 76705 ECHO EXAM OF ABDOMEN: CPT

## 2020-09-13 PROCEDURE — 80053 COMPREHEN METABOLIC PANEL: CPT

## 2020-09-13 PROCEDURE — 76210000063 HC OR PH I REC FIRST 0.5 HR: Performed by: SURGERY

## 2020-09-13 PROCEDURE — 87635 SARS-COV-2 COVID-19 AMP PRB: CPT

## 2020-09-13 PROCEDURE — 99285 EMERGENCY DEPT VISIT HI MDM: CPT

## 2020-09-13 PROCEDURE — 74011250636 HC RX REV CODE- 250/636: Performed by: NURSE ANESTHETIST, CERTIFIED REGISTERED

## 2020-09-13 PROCEDURE — 77030031139 HC SUT VCRL2 J&J -A: Performed by: SURGERY

## 2020-09-13 PROCEDURE — 74011250636 HC RX REV CODE- 250/636: Performed by: EMERGENCY MEDICINE

## 2020-09-13 PROCEDURE — 83690 ASSAY OF LIPASE: CPT

## 2020-09-13 PROCEDURE — 74011000636 HC RX REV CODE- 636: Performed by: SURGERY

## 2020-09-13 PROCEDURE — 76010000149 HC OR TIME 1 TO 1.5 HR: Performed by: SURGERY

## 2020-09-13 PROCEDURE — 74011250636 HC RX REV CODE- 250/636: Performed by: SURGERY

## 2020-09-13 PROCEDURE — 74011000250 HC RX REV CODE- 250: Performed by: NURSE ANESTHETIST, CERTIFIED REGISTERED

## 2020-09-13 PROCEDURE — 77030008683 HC TU ET CUF COVD -A: Performed by: ANESTHESIOLOGY

## 2020-09-13 PROCEDURE — 85025 COMPLETE CBC W/AUTO DIFF WBC: CPT

## 2020-09-13 PROCEDURE — C1758 CATHETER, URETERAL: HCPCS | Performed by: SURGERY

## 2020-09-13 PROCEDURE — 77030013079 HC BLNKT BAIR HGGR 3M -A: Performed by: ANESTHESIOLOGY

## 2020-09-13 PROCEDURE — 65270000029 HC RM PRIVATE

## 2020-09-13 PROCEDURE — 74011250637 HC RX REV CODE- 250/637: Performed by: SURGERY

## 2020-09-13 PROCEDURE — 96374 THER/PROPH/DIAG INJ IV PUSH: CPT

## 2020-09-13 PROCEDURE — 77030008602 HC TRCR ENDOSC EPATH J&J -B: Performed by: SURGERY

## 2020-09-13 PROCEDURE — 96376 TX/PRO/DX INJ SAME DRUG ADON: CPT

## 2020-09-13 PROCEDURE — 77030008603 HC TRCR ENDOSC EPATH J&J -C: Performed by: SURGERY

## 2020-09-13 PROCEDURE — 77030010031 HC SCIS ENDOSC MPLR J&J -C: Performed by: SURGERY

## 2020-09-13 PROCEDURE — 77030009851 HC PCH RTVR ENDOSC AMR -B: Performed by: SURGERY

## 2020-09-13 PROCEDURE — 76060000033 HC ANESTHESIA 1 TO 1.5 HR: Performed by: SURGERY

## 2020-09-13 PROCEDURE — 88304 TISSUE EXAM BY PATHOLOGIST: CPT

## 2020-09-13 PROCEDURE — 2709999900 HC NON-CHARGEABLE SUPPLY: Performed by: SURGERY

## 2020-09-13 PROCEDURE — 96375 TX/PRO/DX INJ NEW DRUG ADDON: CPT

## 2020-09-13 PROCEDURE — 93005 ELECTROCARDIOGRAM TRACING: CPT

## 2020-09-13 PROCEDURE — 77030008756 HC TU IRR SUC STRY -B: Performed by: SURGERY

## 2020-09-13 RX ORDER — DEXAMETHASONE SODIUM PHOSPHATE 4 MG/ML
INJECTION, SOLUTION INTRA-ARTICULAR; INTRALESIONAL; INTRAMUSCULAR; INTRAVENOUS; SOFT TISSUE AS NEEDED
Status: DISCONTINUED | OUTPATIENT
Start: 2020-09-13 | End: 2020-09-13 | Stop reason: HOSPADM

## 2020-09-13 RX ORDER — SODIUM CHLORIDE 0.9 % (FLUSH) 0.9 %
5-40 SYRINGE (ML) INJECTION EVERY 8 HOURS
Status: CANCELLED | OUTPATIENT
Start: 2020-09-13

## 2020-09-13 RX ORDER — HYDROMORPHONE HYDROCHLORIDE 2 MG/ML
1 INJECTION, SOLUTION INTRAMUSCULAR; INTRAVENOUS; SUBCUTANEOUS
Status: DISCONTINUED | OUTPATIENT
Start: 2020-09-13 | End: 2020-09-13 | Stop reason: CLARIF

## 2020-09-13 RX ORDER — MORPHINE SULFATE 4 MG/ML
4 INJECTION, SOLUTION INTRAMUSCULAR; INTRAVENOUS
Status: COMPLETED | OUTPATIENT
Start: 2020-09-13 | End: 2020-09-13

## 2020-09-13 RX ORDER — HYDROMORPHONE HYDROCHLORIDE 1 MG/ML
1 INJECTION, SOLUTION INTRAMUSCULAR; INTRAVENOUS; SUBCUTANEOUS ONCE
Status: COMPLETED | OUTPATIENT
Start: 2020-09-13 | End: 2020-09-13

## 2020-09-13 RX ORDER — NEOSTIGMINE METHYLSULFATE 1 MG/ML
INJECTION, SOLUTION INTRAVENOUS AS NEEDED
Status: DISCONTINUED | OUTPATIENT
Start: 2020-09-13 | End: 2020-09-13 | Stop reason: HOSPADM

## 2020-09-13 RX ORDER — ONDANSETRON 2 MG/ML
4 INJECTION INTRAMUSCULAR; INTRAVENOUS ONCE
Status: CANCELLED | OUTPATIENT
Start: 2020-09-13 | End: 2020-09-13

## 2020-09-13 RX ORDER — OXYCODONE AND ACETAMINOPHEN 5; 325 MG/1; MG/1
1 TABLET ORAL
Qty: 15 TAB | Refills: 0 | Status: SHIPPED | OUTPATIENT
Start: 2020-09-13 | End: 2020-09-16

## 2020-09-13 RX ORDER — SODIUM CHLORIDE, SODIUM LACTATE, POTASSIUM CHLORIDE, CALCIUM CHLORIDE 600; 310; 30; 20 MG/100ML; MG/100ML; MG/100ML; MG/100ML
75 INJECTION, SOLUTION INTRAVENOUS CONTINUOUS
Status: CANCELLED | OUTPATIENT
Start: 2020-09-13 | End: 2020-09-14

## 2020-09-13 RX ORDER — LIDOCAINE HYDROCHLORIDE 20 MG/ML
INJECTION, SOLUTION EPIDURAL; INFILTRATION; INTRACAUDAL; PERINEURAL AS NEEDED
Status: DISCONTINUED | OUTPATIENT
Start: 2020-09-13 | End: 2020-09-13 | Stop reason: HOSPADM

## 2020-09-13 RX ORDER — MIDAZOLAM HYDROCHLORIDE 1 MG/ML
INJECTION, SOLUTION INTRAMUSCULAR; INTRAVENOUS AS NEEDED
Status: DISCONTINUED | OUTPATIENT
Start: 2020-09-13 | End: 2020-09-13 | Stop reason: HOSPADM

## 2020-09-13 RX ORDER — SODIUM CHLORIDE 0.9 % (FLUSH) 0.9 %
5-40 SYRINGE (ML) INJECTION AS NEEDED
Status: CANCELLED | OUTPATIENT
Start: 2020-09-13

## 2020-09-13 RX ORDER — HYDROMORPHONE HYDROCHLORIDE 1 MG/ML
1 INJECTION, SOLUTION INTRAMUSCULAR; INTRAVENOUS; SUBCUTANEOUS
Status: DISCONTINUED | OUTPATIENT
Start: 2020-09-13 | End: 2020-09-13 | Stop reason: HOSPADM

## 2020-09-13 RX ORDER — ENOXAPARIN SODIUM 100 MG/ML
40 INJECTION SUBCUTANEOUS
Status: CANCELLED | OUTPATIENT
Start: 2020-09-13 | End: 2020-09-13

## 2020-09-13 RX ORDER — FENTANYL CITRATE 50 UG/ML
INJECTION, SOLUTION INTRAMUSCULAR; INTRAVENOUS AS NEEDED
Status: DISCONTINUED | OUTPATIENT
Start: 2020-09-13 | End: 2020-09-13 | Stop reason: HOSPADM

## 2020-09-13 RX ORDER — HYDROMORPHONE HYDROCHLORIDE 1 MG/ML
1 INJECTION, SOLUTION INTRAMUSCULAR; INTRAVENOUS; SUBCUTANEOUS
Status: COMPLETED | OUTPATIENT
Start: 2020-09-13 | End: 2020-09-13

## 2020-09-13 RX ORDER — HYDROMORPHONE HYDROCHLORIDE 2 MG/ML
1 INJECTION, SOLUTION INTRAMUSCULAR; INTRAVENOUS; SUBCUTANEOUS
Status: COMPLETED | OUTPATIENT
Start: 2020-09-13 | End: 2020-09-13

## 2020-09-13 RX ORDER — HYDROMORPHONE HYDROCHLORIDE 2 MG/ML
0.5 INJECTION, SOLUTION INTRAMUSCULAR; INTRAVENOUS; SUBCUTANEOUS AS NEEDED
Status: CANCELLED | OUTPATIENT
Start: 2020-09-13

## 2020-09-13 RX ORDER — IBUPROFEN 800 MG/1
800 TABLET ORAL
Qty: 30 TAB | Refills: 1 | Status: SHIPPED | OUTPATIENT
Start: 2020-09-13

## 2020-09-13 RX ORDER — CLINDAMYCIN PHOSPHATE 900 MG/50ML
900 INJECTION, SOLUTION INTRAVENOUS ONCE
Status: COMPLETED | OUTPATIENT
Start: 2020-09-13 | End: 2020-09-13

## 2020-09-13 RX ORDER — GLYCOPYRROLATE 0.2 MG/ML
INJECTION INTRAMUSCULAR; INTRAVENOUS AS NEEDED
Status: DISCONTINUED | OUTPATIENT
Start: 2020-09-13 | End: 2020-09-13 | Stop reason: HOSPADM

## 2020-09-13 RX ORDER — PROPOFOL 10 MG/ML
INJECTION, EMULSION INTRAVENOUS AS NEEDED
Status: DISCONTINUED | OUTPATIENT
Start: 2020-09-13 | End: 2020-09-13 | Stop reason: HOSPADM

## 2020-09-13 RX ORDER — KETOROLAC TROMETHAMINE 15 MG/ML
INJECTION, SOLUTION INTRAMUSCULAR; INTRAVENOUS AS NEEDED
Status: DISCONTINUED | OUTPATIENT
Start: 2020-09-13 | End: 2020-09-13 | Stop reason: HOSPADM

## 2020-09-13 RX ORDER — DIPHENHYDRAMINE HYDROCHLORIDE 50 MG/ML
12.5 INJECTION, SOLUTION INTRAMUSCULAR; INTRAVENOUS
Status: CANCELLED | OUTPATIENT
Start: 2020-09-13

## 2020-09-13 RX ORDER — NALBUPHINE HYDROCHLORIDE 10 MG/ML
5 INJECTION, SOLUTION INTRAMUSCULAR; INTRAVENOUS; SUBCUTANEOUS
Status: CANCELLED | OUTPATIENT
Start: 2020-09-13

## 2020-09-13 RX ORDER — SODIUM CHLORIDE, SODIUM LACTATE, POTASSIUM CHLORIDE, CALCIUM CHLORIDE 600; 310; 30; 20 MG/100ML; MG/100ML; MG/100ML; MG/100ML
75 INJECTION, SOLUTION INTRAVENOUS CONTINUOUS
Status: CANCELLED | OUTPATIENT
Start: 2020-09-13

## 2020-09-13 RX ORDER — ONDANSETRON 2 MG/ML
INJECTION INTRAMUSCULAR; INTRAVENOUS AS NEEDED
Status: DISCONTINUED | OUTPATIENT
Start: 2020-09-13 | End: 2020-09-13 | Stop reason: HOSPADM

## 2020-09-13 RX ORDER — ROCURONIUM BROMIDE 10 MG/ML
INJECTION, SOLUTION INTRAVENOUS AS NEEDED
Status: DISCONTINUED | OUTPATIENT
Start: 2020-09-13 | End: 2020-09-13 | Stop reason: HOSPADM

## 2020-09-13 RX ORDER — SUCCINYLCHOLINE CHLORIDE 20 MG/ML
INJECTION INTRAMUSCULAR; INTRAVENOUS AS NEEDED
Status: DISCONTINUED | OUTPATIENT
Start: 2020-09-13 | End: 2020-09-13 | Stop reason: HOSPADM

## 2020-09-13 RX ORDER — OXYCODONE AND ACETAMINOPHEN 5; 325 MG/1; MG/1
1 TABLET ORAL
Status: DISCONTINUED | OUTPATIENT
Start: 2020-09-13 | End: 2020-09-13 | Stop reason: HOSPADM

## 2020-09-13 RX ADMIN — SUCCINYLCHOLINE CHLORIDE 100 MG: 20 INJECTION, SOLUTION INTRAMUSCULAR; INTRAVENOUS at 16:46

## 2020-09-13 RX ADMIN — PROPOFOL 200 MG: 10 INJECTION, EMULSION INTRAVENOUS at 16:46

## 2020-09-13 RX ADMIN — ROCURONIUM BROMIDE 10 MG: 50 INJECTION INTRAVENOUS at 16:46

## 2020-09-13 RX ADMIN — FENTANYL CITRATE 100 MCG: 50 INJECTION, SOLUTION INTRAMUSCULAR; INTRAVENOUS at 16:46

## 2020-09-13 RX ADMIN — Medication 2 MG: at 17:29

## 2020-09-13 RX ADMIN — HYDROMORPHONE HYDROCHLORIDE 1 MG: 1 INJECTION, SOLUTION INTRAMUSCULAR; INTRAVENOUS; SUBCUTANEOUS at 09:21

## 2020-09-13 RX ADMIN — MORPHINE SULFATE 4 MG: 4 INJECTION, SOLUTION INTRAMUSCULAR; INTRAVENOUS at 08:43

## 2020-09-13 RX ADMIN — DEXAMETHASONE SODIUM PHOSPHATE 4 MG: 4 INJECTION, SOLUTION INTRAMUSCULAR; INTRAVENOUS at 16:46

## 2020-09-13 RX ADMIN — MIDAZOLAM HYDROCHLORIDE 2 MG: 2 INJECTION, SOLUTION INTRAMUSCULAR; INTRAVENOUS at 16:41

## 2020-09-13 RX ADMIN — ONDANSETRON 4 MG: 2 INJECTION INTRAMUSCULAR; INTRAVENOUS at 16:46

## 2020-09-13 RX ADMIN — HYDROMORPHONE HYDROCHLORIDE 1 MG: 1 INJECTION, SOLUTION INTRAMUSCULAR; INTRAVENOUS; SUBCUTANEOUS at 10:41

## 2020-09-13 RX ADMIN — GLYCOPYRROLATE 0.1 MG: 0.2 INJECTION INTRAMUSCULAR; INTRAVENOUS at 17:29

## 2020-09-13 RX ADMIN — KETOROLAC TROMETHAMINE 15 MG: 15 INJECTION, SOLUTION INTRAMUSCULAR; INTRAVENOUS at 16:51

## 2020-09-13 RX ADMIN — LIDOCAINE HYDROCHLORIDE 20 MG: 20 INJECTION, SOLUTION EPIDURAL; INFILTRATION; INTRACAUDAL; PERINEURAL at 16:46

## 2020-09-13 RX ADMIN — CLINDAMYCIN PHOSPHATE 900 MG: 150 INJECTION, SOLUTION INTRAVENOUS at 16:42

## 2020-09-13 RX ADMIN — MORPHINE SULFATE 4 MG: 4 INJECTION, SOLUTION INTRAMUSCULAR; INTRAVENOUS at 07:51

## 2020-09-13 RX ADMIN — OXYCODONE HYDROCHLORIDE AND ACETAMINOPHEN 1 TABLET: 5; 325 TABLET ORAL at 18:52

## 2020-09-13 RX ADMIN — FENTANYL CITRATE 50 MCG: 50 INJECTION, SOLUTION INTRAMUSCULAR; INTRAVENOUS at 17:06

## 2020-09-13 RX ADMIN — FENTANYL CITRATE 50 MCG: 50 INJECTION, SOLUTION INTRAMUSCULAR; INTRAVENOUS at 17:35

## 2020-09-13 RX ADMIN — HYDROMORPHONE HYDROCHLORIDE 1 MG: 2 INJECTION, SOLUTION INTRAMUSCULAR; INTRAVENOUS; SUBCUTANEOUS at 13:57

## 2020-09-13 RX ADMIN — FENTANYL CITRATE 50 MCG: 50 INJECTION, SOLUTION INTRAMUSCULAR; INTRAVENOUS at 17:30

## 2020-09-13 NOTE — ANESTHESIA POSTPROCEDURE EVALUATION
Procedure(s):  CHOLECYSTECTOMY LAPAROSCOPIC.     general    Anesthesia Post Evaluation      Multimodal analgesia: multimodal analgesia used between 6 hours prior to anesthesia start to PACU discharge  Patient location during evaluation: bedside  Patient participation: complete - patient participated  Level of consciousness: awake  Pain score: 1  Pain management: adequate  Airway patency: patent  Anesthetic complications: no  Cardiovascular status: stable  Respiratory status: acceptable  Hydration status: acceptable  Post anesthesia nausea and vomiting:  controlled  Final Post Anesthesia Temperature Assessment:  Normothermia (36.0-37.5 degrees C)      INITIAL Post-op Vital signs:   Vitals Value Taken Time   /78 9/13/2020  5:48 PM   Temp 36.8 °C (98.2 °F) 9/13/2020  5:48 PM   Pulse 68 9/13/2020  5:48 PM   Resp 12 9/13/2020  5:48 PM   SpO2 99 % 9/13/2020  5:48 PM

## 2020-09-13 NOTE — DISCHARGE INSTRUCTIONS
Discharge instructions from Dr. Leo Beltran  Regular diet   Resume pre-admission medications, Motrin sent PRN   Activity ad malachi. avoiding lifting greater than 15 pounds or sit for 1 month after his surgical date   Discontinue dressings begin showers September 15, 2020   Trim Steri-Strips PRN       DISCHARGE SUMMARY from Nurse    PATIENT INSTRUCTIONS:    After general anesthesia or intravenous sedation, for 24 hours or while taking prescription Narcotics:  · Limit your activities  · Do not drive and operate hazardous machinery  · Do not make important personal or business decisions  · Do  not drink alcoholic beverages  · If you have not urinated within 8 hours after discharge, please contact your surgeon on call. Report the following to your surgeon:  · Excessive pain, swelling, redness or odor of or around the surgical area  · Temperature over 100.5  · Nausea and vomiting lasting longer than 4 hours or if unable to take medications  · Any signs of decreased circulation or nerve impairment to extremity: change in color, persistent  numbness, tingling, coldness or increase pain  · Any questions    What to do at Home:  Recommended activity: Activity as tolerated and no driving for today or while taking narcotic pain medicine. *  Please give a list of your current medications to your Primary Care Provider. *  Please update this list whenever your medications are discontinued, doses are      changed, or new medications (including over-the-counter products) are added. *  Please carry medication information at all times in case of emergency situations. These are general instructions for a healthy lifestyle:    No smoking/ No tobacco products/ Avoid exposure to second hand smoke  Surgeon General's Warning:  Quitting smoking now greatly reduces serious risk to your health.     Obesity, smoking, and sedentary lifestyle greatly increases your risk for illness    A healthy diet, regular physical exercise & weight monitoring are important for maintaining a healthy lifestyle    You may be retaining fluid if you have a history of heart failure or if you experience any of the following symptoms:  Weight gain of 3 pounds or more overnight or 5 pounds in a week, increased swelling in our hands or feet or shortness of breath while lying flat in bed. Please call your doctor as soon as you notice any of these symptoms; do not wait until your next office visit. The discharge information has been reviewed with the patient. The patient verbalized understanding. Discharge medications reviewed with the patient and appropriate educational materials and side effects teaching were provided. Patient Education        Gallbladder Removal Surgery: What to Expect at Home  Your Recovery  After your surgery, you will likely feel weak and tired for several days after you return home. Your belly may be swollen. If you had laparoscopic surgery, you may also have pain in your shoulder for about 24 hours. You may have gas or need to burp a lot at first. A few people get diarrhea. The diarrhea usually goes away in 2 to 4 weeks, but it may last longer. How quickly you recover depends on whether you had a laparoscopic or open surgery. · For a laparoscopic surgery, most people can go back to work or their normal routine in 1 to 2 weeks. But it may take longer, depending on the type of work you do. · For an open surgery, it will probably take 4 to 6 weeks before you get back to your normal routine. This care sheet gives you a general idea about how long it will take for you to recover. However, each person recovers at a different pace. Follow the steps below to get better as quickly as possible. How can you care for yourself at home? Activity    · Rest when you feel tired. Getting enough sleep will help you recover.     · Try to walk each day. Start out by walking a little more than you did the day before.  Gradually increase the amount you walk. Walking boosts blood flow and helps prevent pneumonia and constipation.     · For about 2 to 4 weeks, avoid lifting anything that would make you strain. This may include a child, heavy grocery bags and milk containers, a heavy briefcase or backpack, cat litter or dog food bags, or a vacuum .     · Avoid strenuous activities, such as biking, jogging, weightlifting, and aerobic exercise, until your doctor says it is okay.     · You may shower 24 to 48 hours after surgery, if your doctor okays it. Pat the cut (incision) dry. Do not take a bath for the first 2 weeks, or until your doctor tells you it is okay.     · You may drive when you are no longer taking pain medicine and can quickly move your foot from the gas pedal to the brake. You must also be able to sit comfortably for a long period of time, even if you do not plan to go far. You might get caught in traffic.     · For a laparoscopic surgery, most people can go back to work or their normal routine in 1 to 2 weeks, but it may take longer. For an open surgery, it will probably take 4 to 6 weeks before you get back to your normal routine.     · Your doctor will tell you when you can have sex again. Diet    · Eat smaller meals more often instead of fewer larger meals. You can eat a normal diet, but avoid eating fatty foods for about 1 month. Fatty foods include hamburger, whole milk, cheese, and many snack foods. If your stomach is upset, try bland, low-fat foods like plain rice, broiled chicken, toast, and yogurt.     · Drink plenty of fluids (unless your doctor tells you not to).   · If you have diarrhea, try avoiding spicy foods, dairy products, fatty foods, and alcohol. You can also watch to see if specific foods cause it, and stop eating them. If the diarrhea continues for more than 2 weeks, talk to your doctor.     · You may notice that your bowel movements are not regular right after your surgery. This is common.  Try to avoid constipation and straining with bowel movements. You may want to take a fiber supplement every day. If you have not had a bowel movement after a couple of days, ask your doctor about taking a mild laxative. Medicines    · Your doctor will tell you if and when you can restart your medicines. He or she will also give you instructions about taking any new medicines.     · If you take aspirin or some other blood thinner, ask your doctor if and when to start taking it again. Make sure that you understand exactly what your doctor wants you to do.     · Take pain medicines exactly as directed. ? If the doctor gave you a prescription medicine for pain, take it as prescribed. ? If you are not taking a prescription pain medicine, take an over-the-counter medicine such as acetaminophen (Tylenol), ibuprofen (Advil, Motrin), or naproxen (Aleve). Read and follow all instructions on the label. ? Do not take two or more pain medicines at the same time unless the doctor told you to. Many pain medicines contain acetaminophen, which is Tylenol. Too much Tylenol can be harmful.     · If you think your pain medicine is making you sick to your stomach:  ? Take your medicine after meals (unless your doctor tells you not to). ? Ask your doctor for a different pain medicine.     · If your doctor prescribed antibiotics, take them as directed. Do not stop taking them just because you feel better. You need to take the full course of antibiotics. Incision care    · If you have strips of tape on the incision, or cut, leave the tape on for a week or until it falls off.     · After 24 to 48 hours, wash the area daily with warm, soapy water, and pat it dry.     · You may have staples to hold the cut together. Keep them dry until your doctor takes them out. This is usually in 7 to 10 days.     · Keep the area clean and dry. You may cover it with a gauze bandage if it weeps or rubs against clothing. Change the bandage every day.    Ice    · To reduce swelling and pain, put ice or a cold pack on your belly for 10 to 20 minutes at a time. Do this every 1 to 2 hours. Put a thin cloth between the ice and your skin. Follow-up care is a key part of your treatment and safety. Be sure to make and go to all appointments, and call your doctor if you are having problems. It's also a good idea to know your test results and keep a list of the medicines you take. When should you call for help? Call 911 anytime you think you may need emergency care. For example, call if:    · You passed out (lost consciousness).     · You are short of breath. .   Call your doctor now or seek immediate medical care if:    · You are sick to your stomach and cannot drink fluids.     · You have pain that does not get better when you take your pain medicine.     · You cannot pass stools or gas.     · You have signs of infection, such as:  ? Increased pain, swelling, warmth, or redness. ? Red streaks leading from the incision. ? Pus draining from the incision. ? A fever.     · Bright red blood has soaked through the bandage over your incision.     · You have loose stitches, or your incision comes open.     · You have signs of a blood clot in your leg (called a deep vein thrombosis), such as:  ? Pain in your calf, back of knee, thigh, or groin. ? Redness and swelling in your leg or groin. Watch closely for any changes in your health, and be sure to contact your doctor if you have any problems. Where can you learn more? Go to http://swapnil-otilia.info/  Enter F357 in the search box to learn more about \"Gallbladder Removal Surgery: What to Expect at Home. \"  Current as of: April 15, 2020               Content Version: 12.6  © 9549-8522 [employer]. Care instructions adapted under license by [employer] (which disclaims liability or warranty for this information).  If you have questions about a medical condition or this instruction, always ask your healthcare professional. [address] any warranty or liability for your use of this information.          ___________________________________________________________________________________________________________________________________

## 2020-09-13 NOTE — INTERVAL H&P NOTE
Update History & Physical    The Patient's History and Physical of 9/11/2020  Hx, PE was reviewed with the patient and I examined the patient. The patient now has acte calculous cholecystitis. The surgical site was confirmed by the patient and me. Plan:  The risk, benefits, expected outcome, and alternative to the recommended procedure have been discussed with the patient. Patient understands and wants to proceed with the procedure.     Electronically signed by Amos Rome DO on 9/13/2020 at 4:20 PM

## 2020-09-13 NOTE — ED PROVIDER NOTES
EMERGENCY DEPARTMENT HISTORY AND PHYSICAL EXAM  This was created with voice recognition software and transcription errors may be present. 8:37 AM  Date: 9/13/2020  Patient Name: Roscoe Bledsoe    History of Presenting Illness     Chief Complaint:    History Provided By:     HPI: Roscoe Bledsoe is a 48 y.o. male past medical history of reflux and high cholesterol presents with epigastric pain since about 2:30 AM associate with vomiting x1 just prior to arrival.  Patient thinks that it is his gallbladder. No fevers no chills no diarrhea no chest pain or shortness of breath. He is due to have his gallbladder removed in early October by Dr. Munoz Notice    PCP: Ian Bledsoe MD      Past History     Past Medical History:  Past Medical History:   Diagnosis Date    GERD (gastroesophageal reflux disease)     Hypercholesterolemia        Past Surgical History:  Past Surgical History:   Procedure Laterality Date    HX COLONOSCOPY      HX ORTHOPAEDIC      ankle right fx screw, right elbow dislocation pins reconstruction done    HX WISDOM TEETH EXTRACTION         Family History:  Family History   Problem Relation Age of Onset    Diabetes Father     Heart Attack Father 61    Stroke Father 64    Hypertension Father    [facility] Arthritis-rheumatoid Mother     Dementia Maternal Grandmother     Heart Disease Maternal Grandfather 54       Social History:  Social History     Tobacco Use    Smoking status: Never Smoker    Smokeless tobacco: Never Used   Substance Use Topics    Alcohol use: Yes     Frequency: Monthly or less    Drug use: No       Allergies: Allergies   Allergen Reactions    Pcn [Penicillins] Unknown (comments)    Statins-Hmg-Coa Reductase Inhibitors Other (comments)     Generalize fatigue and body ache. Probably taken simvastatin. Review of Systems     Review of Systems   All other systems reviewed and are negative.     10 point review of systems otherwise negative unless noted in HPI.    Physical Exam       Physical Exam  Constitutional:       Appearance: He is well-developed. HENT:      Head: Normocephalic and atraumatic. Eyes:      Pupils: Pupils are equal, round, and reactive to light. Neck:      Musculoskeletal: Normal range of motion and neck supple. Cardiovascular:      Rate and Rhythm: Normal rate and regular rhythm. Heart sounds: Normal heart sounds. No murmur. No friction rub. Pulmonary:      Effort: Pulmonary effort is normal. No respiratory distress. Breath sounds: Normal breath sounds. No wheezing. Abdominal:      General: There is no distension. Palpations: Abdomen is soft. Tenderness: There is no abdominal tenderness. There is no guarding or rebound. Comments: Abdomen exam is not terribly remarkable. He does have some epigastric pain although no significant tenderness over the gallbladder itself. No rebound no guarding   Musculoskeletal: Normal range of motion. Skin:     General: Skin is warm and dry. Neurological:      Mental Status: He is alert and oriented to person, place, and time. Psychiatric:         Behavior: Behavior normal.         Thought Content: Thought content normal.         Diagnostic Study Results     Vital Signs  EKG: EKG shows sinus at 65 with a normal axis and normal intervals troubles there is no ST elevation or depression no hypertrophy. Labs: Pertinent negative x2  Imaging: IMPRESSION:     Cholelithiasis without secondary signs of acute cholecystitis. Limited visualization of the pancreas. Medical Decision Making     ED Course: Progress Notes, Reevaluation, and Consults:      Provider Notes (Medical Decision Making):  This is a 52-year-old gentleman has been having ongoing epigastric pain since 2 AM  EKG is shows no acute findings will check troponin as well as basic labs/US>    Ultrasound unremarkable lipase negative  See unremarkable troponin negative x2 EKG unremarkable    Patient states this is a exact same pain which has been diagnosed with biliary colic and normally it goes away but just this time it has not. With ongoing pain discussed with Dr. Shannon Ramirez will admit to the spittle likely for cholecystectomy given to unrelenting pain       Diagnosis     Clinical Impression: No diagnosis found. Disposition:    Patient's Medications   Start Taking    No medications on file   Continue Taking    ATORVASTATIN (LIPITOR) 40 MG TABLET    TAKE ONE TABLET BY MOUTH ONCE DAILY    CETIRIZINE (ZYRTEC) 10 MG TABLET    Take 10 mg by mouth as needed. OMEPRAZOLE (PRILOSEC) 40 MG CAPSULE    Take 1 Cap by mouth daily.    These Medications have changed    No medications on file   Stop Taking    No medications on file

## 2020-09-13 NOTE — ED TRIAGE NOTES
Patient presents to the ED with complaints of gallbladder pain x today.  Patient states he is schedule for cholecysectomy on 10/5/2020

## 2020-09-13 NOTE — ROUTINE PROCESS
Spoke to patients nurse in the ER. Will be getting the patient as soon as Covid testing is complete.

## 2020-09-13 NOTE — BRIEF OP NOTE
Brief Postoperative Note    Patient: Chilo Page  YOB: 1965  MRN: [id number]    Date of Procedure: 9/13/2020     Pre-Op Diagnosis: Acute calculus cholecystitis    Post-Op Diagnosis:NOEMY    Procedure(s):  CHOLECYSTECTOMY LAPAROSCOPIC with intraoperative cholangiography    Surgeon(s):  Diego Kirkland DO    Surgical Assistant: None    Anesthesia: General     Estimated Blood Loss (mL): Minimal    Complications: None    Specimens:   ID Type Source Tests Collected by Time Destination   1 : gallbladder with contents Preservative Gallbladder  Diego Kirkland DO 9/13/2020 1714 Pathology        Implants: * No implants in log *    Drains: * No LDAs found *    Findings: Acute calculus cholecystitis, normal intraoperative cholangiography    Electronically Signed by Francisco J Layton DO on 9/13/2020 at 5:48 PM

## 2020-09-13 NOTE — ED NOTES
Patient laying in bed alert and oriented x3. Patient remains on monitors. No signs of distress noted at this time. Will continue to monitor.

## 2020-09-13 NOTE — ANESTHESIA PREPROCEDURE EVALUATION
Relevant Problems   No relevant active problems       Anesthetic History   No history of anesthetic complications            Review of Systems / Medical History  Patient summary reviewed and pertinent labs reviewed    Pulmonary  Within defined limits                 Neuro/Psych   Within defined limits           Cardiovascular              Hyperlipidemia    Exercise tolerance: >4 METS     GI/Hepatic/Renal     GERD           Endo/Other        Obesity     Other Findings              Physical Exam    Airway  Mallampati: II  TM Distance: 4 - 6 cm  Neck ROM: normal range of motion   Mouth opening: Normal     Cardiovascular  Regular rate and rhythm,  S1 and S2 normal,  no murmur, click, rub, or gallop  Rhythm: regular  Rate: normal         Dental    Dentition: Lower dentition intact and Upper dentition intact     Pulmonary  Breath sounds clear to auscultation               Abdominal  GI exam deferred       Other Findings            Anesthetic Plan    ASA: 2  Anesthesia type: general          Induction: Intravenous  Anesthetic plan and risks discussed with: Patient

## 2020-09-14 ENCOUNTER — PATIENT OUTREACH (OUTPATIENT)
Dept: CASE MANAGEMENT | Age: 55
End: 2020-09-14

## 2020-09-14 NOTE — PROGRESS NOTES
Care Transitions Nurse ( CTN) attempted to contact patient via telephone call. There was no response and no option to leave a voicemail message at this time.

## 2020-09-14 NOTE — OP NOTES
[address] [place] [address]   OPERATIVE REPORT    Name:  Doni Quintanilla  MR#:   [id number]  :  1965  ACCOUNT #:  [de-identified]  DATE OF SERVICE:  2020    PREOPERATIVE DIAGNOSIS:  Acute calculous cholecystitis. POSTOPERATIVE DIAGNOSIS:  Acute calculous cholecystitis. PROCEDURE PERFORMED:  Laparoscopic cholecystectomy with intraoperative cholangiography. SURGEON:  Karlee Collazo. Александр Tristan DO    ASSISTANT: Josselyn Alvarez SA.    ANESTHESIA:  General endotracheal supplemented at the conclusion of the operative procedure with local infiltration of the operative sites with 266 mg of Exparel diluted in 40 mL of normal saline solution. COMPLICATIONS: None. SPECIMENS REMOVED:  Gallbladder and contents. IMPLANTS: None. ESTIMATED BLOOD LOSS: Less than 25 cc. OPERATIVE FINDINGS:  Distended, tense gallbladder with acute edematous changes. Normal intraoperative cholangiography. INDICATIONS FOR SURGICAL PROCEDURE:  This is a 52-year-old white male with a clinical history, physical examination, laboratory profile, and radiographic evaluation significant for the presence of acute calculous cholecystitis. After discussing the options for management, the patient elected to pursue laparoscopic, potentially open cholecystectomy with intraoperative cholangiography. The risks and benefits of operative versus nonoperative potential alternatives and potential complications up to and including death were extensively discussed with the patient prior to surgical procedure who voiced his understanding and wished to proceed. DESCRIPTION OF SURGICAL PROCEDURE:  The patient received Ancef for antibiotic prophylaxis and Lovenox for DVT prophylaxis in preoperative staging area.   After voiding and then signing his operative permit, which was properly witnessed, he was then transported to the operating room, where he was placed in supine position on the operating table, and SCDs were placed and inflated prior to the induction of general endotracheal anesthesia. The abdomen was then prepped and draped in usual sterile fashion, and time-out procedure was performed according to protocol and agreed upon by all personnel in the operating suite. A transverse 12-mm cutaneous incision was made just superior to the umbilicus in the midline, through which, a 12-mm Optiview trocar and cannula were placed in the peritoneal cavity under direct vision utilizing a 10-mm 0-degree laparoscope. The laparoscope and trocar were then removed. The abdomen was insufflated with carbon dioxide gas never exceeding a pressure of 15 mmHg, and a 30-degree 10-mm laparoscope was placed and utilized for the remainder of the procedure. A second transverse 12-mm cutaneous incision was made one-third distance from the xiphisternal junction to the umbilicus in the midline, through which, a 12-mm Optiview trocar and cannula were placed in the peritoneal cavity under direct vision. Two transverse 5-mm cutaneous incisions were then made two fingerbreadths below the right costal margin, the first in the right midclavicular and the second in the right anterior axillary line, through which, 5-mm Optiview trocars and cannulas were placed into the peritoneal cavity under direct vision. Visualization of the gallbladder noted it to be distended, pale with acute edematous changes, it was tense, and therefore, a transverse 2-mm cutaneous incision was made in the right midclavicular line adjacent to the previously placed port site, and a Veress needle was introduced into the gallbladder under direct vision and aspirated up to 50 mL of bilious fluid. The gallbladder fundus was then grasped and retracted superiorly. The infundibulum was grasped and retracted in an inferolateral direction.   A peritoneal incision was made at the junction of the cystic duct with the gallbladder, and this was carried both medially and laterally at the junction of the gallbladder with the liver to the fundus. Further dissection allowed clear delineation of the triangle of Calot with the cystic artery and the cystic duct, and a critical view was obtained. The cystic artery was doubly clipped proximally and distally, cystic duct was clipped proximally. A cystic ductotomy was performed. A 14-Estonian angiocatheter was                                      introduced percutaneously into the peritoneal cavity, through which a 4-Estonian ureteral catheter was introduced into the peritoneal cavity and subsequently into cystic ductotomy, where it was held in position utilizing a retaining clip. Intraoperative cholangiography was then pursued utilizing fluoroscopy. A total of 20 mL of Conray 60 was utilized for the cholangiogram.  The intraoperative cholangiogram demonstrated the cystic duct, cystic duct junction with the common hepatic duct, left and right hepatic duct, common hepatic duct, and common bile duct without evidence of filling defect and a free spillage of contrast into the duodenum. The retaining clip, ureteral catheter, and angiocatheter were then removed. The cystic duct was doubly clipped proximally and transected. The cystic artery was then transected, and the gallbladder was dissected free from the hepatic fossa utilizing electrocautery, placed into an Endopouch and removed through the epigastric port site. The epigastric fascial trocar defect was then closed with a suture passing device and #2 Vicryl suture. The laparoscope was shifted to the epigastric port site and utilized to visualize the supraumbilical fascial trocar defect which was closed with a suture passing device and #2 Vicryl  suture. All operative sites were inspected and noted to be hemostatic. The abdomen was then desufflated of carbon dioxide gas. The trocars after removal allowed tying of the fascial sutures.   The wounds were irrigated with normal saline solution, and closure of the skin was accomplished with a series of simple interrupted buried deep dermal 4-0 Monocryl sutures. The wound was infiltrated with a mixture of normal saline and 266 mg of Exparel utilizing a total volume of 40 mL. Steri-Strips were then applied. The sponge and needle counts were correct both during and at the completion of the procedure. The patient tolerated the procedure without complications and subsequently was extubated and transported to the recovery room in awake, alert, and in stable condition. The estimated blood loss less than 25 mL. The patient received 1 liter of crystalloid during the procedure. Operative start time was 1502, completion time 1741.       DO LINSEY [id number]  D:  09/13/2020 18:08  T:  09/14/2020 2:56  JOB #:  [id number]

## 2020-09-15 ENCOUNTER — PATIENT OUTREACH (OUTPATIENT)
Dept: CASE MANAGEMENT | Age: 55
End: 2020-09-15

## 2020-09-15 NOTE — PROGRESS NOTES
Patient was admitted to [facility] on 2020  and discharged on 2020 for:     POSTOPERATIVE DIAGNOSIS:  Acute calculous cholecystitis.     PROCEDURE PERFORMED:  Laparoscopic cholecystectomy with intraoperative cholangiography    Per operative report of 2020       Patient was contacted within 2  business days of discharge. Top Discharge Challenges to be reviewed by the provider   Additional needs identified to be addressed with provider no    Discussed COVID-19 related testing which was available at this time. Test results were negative. Patient informed of results, if available? no as results were not pending at time of discharge. Method of communication with provider : none       Advance Care Planning:   Does patient have an Advance Directive:  none noted at this time     Inpatient Readmission Risk score: 7%  Was this a readmission? no   Patient stated reason for the admission: n/a   Patients top risk factors for readmission: medical condition  Interventions to address risk factors:   See goals please     Care Transition Nurse (CTN) contacted the patient by telephone to perform post hospital discharge assessment. Verified name and  with patient as identifiers. Provided introduction to self, and explanation of the CTN role. CTN reviewed discharge instructions, medical action plan and red flags with patient who verbalized understanding. Patient given an opportunity to ask questions and does not have any further questions or concerns at this time. The patient agrees to contact the PCP office for questions related to their healthcare. Medication reconciliation was performed with patient, who verbalizes understanding of administration of home medications.    Referral to Pharm D needed: no     Home Health/Outpatient orders at discharge: none noted at this time   [address]: n/a  Date of initial visit: Francisco Hicks ordered at discharge: none noted at this time  [address]:   [facility] received:     Covid Risk Education    Patient has following risk factors of: no known risk factors. However, patient had had a recent surgical procedure and has a history of hyperlipidemia and obesity. Education provided regarding infection prevention, and signs and symptoms of COVID-19 and when to seek medical attention with patient who verbalized understanding. Discussed exposure protocols and quarantine From CDC: Are you at higher risk for severe illness?  and given an opportunity for questions and concerns. The patient agrees to contact the COVID-19 hotline [phone] or PCP office for questions related to COVID-19. For more information on steps you can take to protect yourself, see CDC's How to Protect Yourself     Patient/family/caregiver given information for Get[employer] Loop and agrees to enroll no       Discussed follow-up appointments. If no appointment was previously scheduled, appointment scheduling offered: patient does not want to schedule a PCP follow up at this time. [address] follow up appointment(s):   Future Appointments   Date Time Provider Annetta Mattson   9/25/2020  9:00 AM DO EDMOND Bourgeois   8/6/2021  9:20 AM Kendrick Kennedy  [facility]     Non-[facility] follow up appointment(s):    Patient reports that he has already scheduled his post op appointment with surgeon. Plan for follow-up call in 10-14 days based on severity of symptoms and risk factors. CTN provided contact information for future needs. Goals Addressed                 This Visit's Progress     Attends follow-up appointments as directed. Target Date:  10/15/2020    9/15/2020     Patient declines follow up appointment with PCP at this time. Patient reports that post op appointment has been scheduled.  Prevent complications post hospitalization.         Target Date: 10/15/2020      9/15/2020   Patient and/or family members were alerted to the availability of physician or other advanced practioners after hours, weekends, and holidays. Please call the PCP office phone number and speak with the answering service for assistance. Please call 911 for emergency assistance as needed. Nurse Navigator contact information provided for follow up as needed. 9/15/2020   Patient advises he received discharge instructions and does not have any questions re: dc instructions at this time.  Understands red flags post discharge. Target Date: 10/15/2020    Care Transitions Nurse (CTN) reviewed red flags with patient as follows:   Please call 911 for immediate assistance for symptoms such as:   - Chest Pain  - Severe shortness of breath   - Change in mental status   - Blue tint to face or lips   - New or worsening symptoms          Patient reports:   - He is doing fine and OK.

## 2020-09-25 ENCOUNTER — OFFICE VISIT (OUTPATIENT)
Dept: SURGERY | Age: 55
End: 2020-09-25

## 2020-09-25 ENCOUNTER — OFFICE VISIT (OUTPATIENT)
Dept: SURGERY | Age: 55
End: 2020-09-25
Payer: COMMERCIAL

## 2020-09-25 VITALS
SYSTOLIC BLOOD PRESSURE: 130 MMHG | DIASTOLIC BLOOD PRESSURE: 87 MMHG | HEART RATE: 73 BPM | BODY MASS INDEX: 35.35 KG/M2 | OXYGEN SATURATION: 98 % | HEIGHT: 72 IN | WEIGHT: 261 LBS | TEMPERATURE: 97.7 F | RESPIRATION RATE: 18 BRPM

## 2020-09-25 VITALS
HEIGHT: 72 IN | RESPIRATION RATE: 18 BRPM | OXYGEN SATURATION: 98 % | DIASTOLIC BLOOD PRESSURE: 87 MMHG | SYSTOLIC BLOOD PRESSURE: 130 MMHG | BODY MASS INDEX: 35.35 KG/M2 | TEMPERATURE: 97.7 F | WEIGHT: 261 LBS | HEART RATE: 73 BPM

## 2020-09-25 DIAGNOSIS — Z90.49 STATUS POST LAPAROSCOPIC CHOLECYSTECTOMY: Primary | ICD-10-CM

## 2020-09-25 PROCEDURE — 99024 POSTOP FOLLOW-UP VISIT: CPT | Performed by: NURSE PRACTITIONER

## 2020-09-28 ENCOUNTER — PATIENT OUTREACH (OUTPATIENT)
Dept: CASE MANAGEMENT | Age: 55
End: 2020-09-28

## 2020-09-28 NOTE — PROGRESS NOTES
Transitions of Care     Care Transitions Nurse ( CTN) attempted to contact patient via telephone call for follow up. There was no response. A voicemail message was left requesting a non-emergency return telephone call. CTN  contact information provided.

## 2020-09-29 NOTE — PROGRESS NOTES
Subjective:      Misty Montanez is a 48 y.o. male presents for postop care 12 days status post Laparoscopic cholecystectomy with intraoperative cholangiography. Pain is well controlled. Appetite is good. Eating a regular diet without difficulty. Bowel movements are regular. Objective:     Visit Vitals  /87   Pulse 73   Temp 97.7 °F (36.5 °C) (Temporal)   Resp 18   Ht 6' (1.829 m)   Wt 118.4 kg (261 lb)   SpO2 98%   BMI 35.40 kg/m²       General:  alert, cooperative, no distress, appears stated age   Abdomen: soft, bowel sounds active, non-tender   Incision:   healing well, no drainage, no erythema, no hernia, no seroma, no swelling, no dehiscence, incision well approximated       Pathology:  GALLBLADDER, RESECTION:   CHOLELITHIASIS WITH MARKED CHRONIC CHOLECYSTITIS. Assessment:   Doing well postoperatively. Plan:   Path report reviewed, questions answered.    - Continue current medications  - Wound care discussed  - Pt is to increase activities as tolerated, no lifting >15lbs for 1 month post op.   - followup prn      Signed By: Rangel Brewster NP     September 29, 2020

## 2021-08-04 DIAGNOSIS — E78.5 HYPERLIPIDEMIA, UNSPECIFIED HYPERLIPIDEMIA TYPE: Primary | ICD-10-CM

## 2021-08-06 ENCOUNTER — OFFICE VISIT (OUTPATIENT)
Dept: CARDIOLOGY CLINIC | Age: 56
End: 2021-08-06
Payer: COMMERCIAL

## 2021-08-06 VITALS
HEART RATE: 97 BPM | SYSTOLIC BLOOD PRESSURE: 130 MMHG | WEIGHT: 277 LBS | OXYGEN SATURATION: 99 % | HEIGHT: 72 IN | DIASTOLIC BLOOD PRESSURE: 82 MMHG | BODY MASS INDEX: 37.52 KG/M2

## 2021-08-06 DIAGNOSIS — Z82.49 FAMILY HISTORY OF ISCHEMIC HEART DISEASE: Primary | ICD-10-CM

## 2021-08-06 DIAGNOSIS — E78.5 HYPERLIPIDEMIA, UNSPECIFIED HYPERLIPIDEMIA TYPE: ICD-10-CM

## 2021-08-06 PROCEDURE — 99215 OFFICE O/P EST HI 40 MIN: CPT | Performed by: INTERNAL MEDICINE

## 2021-08-06 PROCEDURE — 93000 ELECTROCARDIOGRAM COMPLETE: CPT | Performed by: INTERNAL MEDICINE

## 2021-08-06 RX ORDER — FEXOFENADINE HCL AND PSEUDOEPHEDRINE HCI 60; 120 MG/1; MG/1
1 TABLET, EXTENDED RELEASE ORAL
COMMUNITY

## 2021-08-06 RX ORDER — ATORVASTATIN CALCIUM 40 MG/1
TABLET, FILM COATED ORAL
Qty: 30 TABLET | Refills: 6 | Status: SHIPPED | OUTPATIENT
Start: 2021-08-06 | End: 2021-10-12

## 2021-08-06 NOTE — PROGRESS NOTES
Fan Montez    F/u HL/ Prevention    HPI    Fan Montez is a 55 y.o. male with no known cardiac disease who initially presented to me back in 2018 for dyslipidemia and chest pain. Atypical CP/ GI etiology:  As you know, Zachary Richards is a very pleasant gentleman who presented to the ED ([facility]) after experiencing somewhat sudden onset substernal chest pressure. He was laying down at night and noted the discomfort, it lasted about 15 minutes and aborted spontaneously. Based on his risk factors, I sent him for a stress test 2018 which was normal. He ended up seeing his GI- had an EGD with dilation, has been on PPIs and his discomfort has resolved. He has no current complaints. Hyperlipidemia:  He has a long history of hyperlipidemia, and tells me his LDL was even 190-200s when he was in the best shape of his life/ in his 35s. He has tried a few different statins over the years but stopped because he didn't like how they made him feel. Specifically, no indication of severe rhabdo- rather vague muscle fatigue and cramping in his legs. It should be noted he has a positive FH of premature CAD in his father who had his first heart attack at approx age 62, subsequently had a stroke and ultimately  of complications/ heart failure. His mother  at age 76 of pancreatic cancer. He has no siblings, but has 3 children. He has never smoked but was exposed to some second hand smoke from his father. I started him on high intensity statin- atorvastatin and checked in on him frequently. No intolerances noticed and we achieved reduction his LDL with in 3 months, which was reviewed with him today. He continues to improve and his LDL is even better as listed below. PreHTN/ Weight Gain:  His weight has been somewhat up and down but he continues to be on the right path. He struggles the most with food choices (not portions).  He doesn't like most vegetables and prefers carbs (bagels) and sweets. He drinks caffeine free tea with splenda with every meal.    Overall he is done really well from a cardiac standpoint. After he saw me last time he kept having these episodes of chest pain family went to the ER and was found to have acute cholecystitis and eventually had his gallbladder removed he has not had any of the symptoms since. He is frustrated that since he has been working from home he has gained a significant amount of weight this last year. Past Medical History:   Diagnosis Date    GERD (gastroesophageal reflux disease)     Hypercholesterolemia      Patient Active Problem List   Diagnosis Code    Dysphagia R13.10    Family history of ischemic heart disease Z82.49    Hyperlipidemia E78.5    Onychomycosis B35.1    Severe obesity ([address] C50.59    Biliary colic W87.80     Past Surgical History:   Procedure Laterality Date    HX COLONOSCOPY      HX ORTHOPAEDIC      ankle right fx screw, right elbow dislocation pins reconstruction done    HX WISDOM TEETH EXTRACTION       Current Outpatient Medications   Medication Sig Dispense Refill    fexofenadine-pseudoephedrine (Allegra-D 12 Hour)  mg Tb12 Take 1 Tablet by mouth every twelve (12) hours.  ibuprofen (MOTRIN) 800 mg tablet Take 1 Tab by mouth every eight (8) hours as needed for Pain. Indications: pain 30 Tab 1    atorvastatin (LIPITOR) 40 mg tablet TAKE ONE TABLET BY MOUTH ONCE DAILY 30 Tab 6    omeprazole (PRILOSEC) 40 mg capsule Take 1 Cap by mouth daily. Allergies   Allergen Reactions    Pcn [Penicillins] Unknown (comments)    Statins-Hmg-Coa Reductase Inhibitors Other (comments)     Generalize fatigue and body ache. Probably taken simvastatin.          Family History   Problem Relation Age of Onset    Diabetes Father     Heart Attack Father 61    Stroke Father 64    Hypertension Father    Noreen Lawrence Arthritis-rheumatoid Mother     Dementia Maternal Grandmother     Heart Disease Maternal Grandfather 54     Self employed    Review of Systems    Review of Systems negative unless otherwise mentioned in the HPI. Physical Exam:    Visit Vitals  /82 (BP 1 Location: Left upper arm, BP Patient Position: Sitting, BP Cuff Size: Adult)   Pulse 97   Ht 6' (1.829 m)   Wt 125.6 kg (277 lb)   SpO2 99%   BMI 37.57 kg/m²      Wt Readings from Last 3 Encounters:   08/06/21 125.6 kg (277 lb)   09/25/20 118.4 kg (261 lb)   09/25/20 118.4 kg (261 lb)     Physical Exam  Constitutional:       General: He is not in acute distress. Appearance: He is well-developed. He is not diaphoretic. HENT:      Head: Atraumatic. Eyes:      General: No scleral icterus. Pupils: Pupils are equal, round, and reactive to light. Neck:      Thyroid: No thyromegaly. Vascular: No JVD. Cardiovascular:      Rate and Rhythm: Normal rate and regular rhythm. Heart sounds: Normal heart sounds. No murmur heard. No friction rub. No gallop. Pulmonary:      Breath sounds: Normal breath sounds. Abdominal:      Palpations: Abdomen is soft. Skin:     General: Skin is warm and dry. Coloration: Skin is not pale. Findings: No erythema or rash. Comments: No xanthomas noted   Neurological:      Mental Status: He is alert and oriented to person, place, and time. Psychiatric:         Behavior: Behavior normal.       Last  (9/7/2018)    Lab Results   Component Value Date/Time    Cholesterol, total 203 (H) 07/27/2020 11:22 AM    HDL Cholesterol 36 (L) 07/27/2020 11:22 AM    LDL, calculated 136 (H) 07/27/2020 11:22 AM    VLDL, calculated 31 07/27/2020 11:22 AM    Triglyceride 154 (H) 07/27/2020 11:22 AM       11/01/18   ECHO STRESS 11/01/2018 11/1/2018    Narrative · Stress echocardiogram is normal. Low risk study. · Baseline ECG: Normal sinus rhythm.   · Negative stress electrocardiogram.        Signed by: Yulissa Johnson MD       Impression and Plan:  Tim Horta is a 55 y.o. male with:    1.) Atypical chest pain, was acute louis  2.) Heterozygous familial hyperlipidemia, had great/ expected goal with HI statin, continue current care, our ultimate goal will be LDL <70 but agree can hold off on +Zetia + PCSK9 inhibitor  3.) PreHTN, his SBP is 130s    1.) Continue Atorvastatin 40 qhs (high intensity)  2.) Recheck FLP -labslip given  3.) Aggressive risk factor modification discussed- includes more of a Meditterean type diet of more fresh fruits and vegetables/ less simple sugars and refined processed foods. Goal of at least 15-20 lbs at our next visit. Overall he is doing well. I am aware that his LDL bumped a bit out of goal on this year's check. He admits he ran out of the atorvastatin a couple days prior to the check and I do think this can explain it for him. Ahead and refilled his atorvastatin. We will see him back in a year and I would like his lipids to be checked before then.    >50 mins spent,    Thank you for allowing me to participate in the care of your patient, please do not hesitate to call with questions or concerns. Follow-up and Dispositions    · Return in about 1 year (around 8/6/2022).      Kindest Regards,    Kwadwo Jones, DO

## 2021-10-12 RX ORDER — ATORVASTATIN CALCIUM 40 MG/1
TABLET, FILM COATED ORAL
Qty: 30 TABLET | Refills: 6 | Status: SHIPPED | OUTPATIENT
Start: 2021-10-12 | End: 2022-05-13

## 2021-11-11 ENCOUNTER — HOSPITAL ENCOUNTER (OUTPATIENT)
Dept: LAB | Age: 56
Discharge: HOME | End: 2021-11-11

## 2021-11-11 DIAGNOSIS — Z82.49 FAMILY HISTORY OF ISCHEMIC HEART DISEASE: ICD-10-CM

## 2021-11-11 DIAGNOSIS — E78.5 HYPERLIPIDEMIA, UNSPECIFIED HYPERLIPIDEMIA TYPE: ICD-10-CM

## 2021-11-11 LAB — XX-LABCORP SPECIMEN COL,LCBCF: NORMAL

## 2021-11-11 PROCEDURE — 99001 SPECIMEN HANDLING PT-LAB: CPT

## 2021-11-12 LAB
CHOLEST SERPL-MCNC: 147 MG/DL (ref 100–199)
HDLC SERPL-MCNC: 37 MG/DL
IMP & REVIEW OF LAB RESULTS: NORMAL
LDLC SERPL CALC-MCNC: 85 MG/DL (ref 0–99)
TRIGL SERPL-MCNC: 138 MG/DL (ref 0–149)
VLDLC SERPL CALC-MCNC: 25 MG/DL (ref 5–40)

## 2021-11-16 NOTE — PROGRESS NOTES
Per your last note\" Impression and Plan:  Laurent Alfaro is a 55 y.o. male with:     1.) Atypical chest pain, was acute louis  2.) Heterozygous familial hyperlipidemia, had great/ expected goal with HI statin, continue current care, our ultimate goal will be LDL <70 but agree can hold off on +Zetia + PCSK9 inhibitor  3.) PreHTN, his SBP is 130s     1.) Continue Atorvastatin 40 qhs (high intensity)  2.) Recheck FLP -labslip given  3.) Aggressive risk factor modification discussed- includes more of a [place]an type diet of more fresh fruits and vegetables/ less simple sugars and refined processed foods. Goal of at least 15-20 lbs at our next visit.     Overall he is doing well. I am aware that his LDL bumped a bit out of goal on this year's check. He admits he ran out of the atorvastatin a couple days prior to the check and I do think this can explain it for him. Ahead and refilled his atorvastatin.   We will see him back in a year and I would like his lipids to be checked before then.

## 2021-12-01 ENCOUNTER — TELEPHONE (OUTPATIENT)
Dept: CARDIOLOGY CLINIC | Age: 56
End: 2021-12-01

## 2021-12-01 NOTE — LETTER
12/20/2021 10:20 AM    Mr. Maria Luisa [place]  [id number]      We have been trying to reach you to inform you of your results. Please give our office a call so we can give results and any recommendations.         Sincerely,        Ammy Saldivar, DO

## 2021-12-02 NOTE — TELEPHONE ENCOUNTER
----- Message from Cee Cristobal DO sent at 11/17/2021 12:37 PM EST -----  #s look great and back down to his usual :)  ----- Message -----  From: Chasidy Rico LPN  Sent: [id number]/64/9962   2:24 PM EST  To: Cee Cristobal DO    Per your last note\" Impression and Plan:  Shiv Jiang is a 55 y.o. male with:     1.) Atypical chest pain, was acute louis  2.) Heterozygous familial hyperlipidemia, had great/ expected goal with HI statin, continue current care, our ultimate goal will be LDL <70 but agree can hold off on +Zetia + PCSK9 inhibitor  3.) PreHTN, his SBP is 130s     1.) Continue Atorvastatin 40 qhs (high intensity)  2.) Recheck FLP -labslip given  3.) Aggressive risk factor modification discussed- includes more of a [place] type diet of more fresh fruits and vegetables/ less simple sugars and refined processed foods. Goal of at least 15-20 lbs at our next visit.     Overall he is doing well. I am aware that his LDL bumped a bit out of goal on this year's check. He admits he ran out of the atorvastatin a couple days prior to the check and I do think this can explain it for him. Ahead and refilled his atorvastatin.   We will see him back in a year and I would like his lipids to be checked before then.

## 2022-03-18 PROBLEM — K80.50 BILIARY COLIC: Status: ACTIVE | Noted: 2020-09-13

## 2022-03-19 PROBLEM — E66.01 SEVERE OBESITY (HCC): Status: ACTIVE | Noted: 2018-12-07

## 2022-03-20 PROBLEM — B35.1 ONYCHOMYCOSIS: Status: ACTIVE | Noted: 2018-09-07

## 2022-03-20 PROBLEM — E78.5 HYPERLIPIDEMIA: Status: ACTIVE | Noted: 2018-09-07

## 2022-05-13 RX ORDER — ATORVASTATIN CALCIUM 40 MG/1
TABLET, FILM COATED ORAL
Qty: 30 TABLET | Refills: 6 | Status: SHIPPED | OUTPATIENT
Start: 2022-05-13 | End: 2022-08-10 | Stop reason: SDUPTHER

## 2022-08-10 ENCOUNTER — OFFICE VISIT (OUTPATIENT)
Dept: CARDIOLOGY CLINIC | Age: 57
End: 2022-08-10
Payer: COMMERCIAL

## 2022-08-10 VITALS
BODY MASS INDEX: 31.42 KG/M2 | WEIGHT: 232 LBS | HEART RATE: 86 BPM | DIASTOLIC BLOOD PRESSURE: 82 MMHG | HEIGHT: 72 IN | OXYGEN SATURATION: 99 % | SYSTOLIC BLOOD PRESSURE: 114 MMHG

## 2022-08-10 DIAGNOSIS — Z82.49 FAMILY HISTORY OF ISCHEMIC HEART DISEASE: Primary | ICD-10-CM

## 2022-08-10 DIAGNOSIS — E78.5 HYPERLIPIDEMIA, UNSPECIFIED HYPERLIPIDEMIA TYPE: ICD-10-CM

## 2022-08-10 PROCEDURE — 99214 OFFICE O/P EST MOD 30 MIN: CPT | Performed by: INTERNAL MEDICINE

## 2022-08-10 PROCEDURE — 93000 ELECTROCARDIOGRAM COMPLETE: CPT | Performed by: INTERNAL MEDICINE

## 2022-08-10 RX ORDER — ATORVASTATIN CALCIUM 40 MG/1
40 TABLET, FILM COATED ORAL DAILY
Qty: 90 TABLET | Refills: 3 | Status: SHIPPED | OUTPATIENT
Start: 2022-08-10

## 2022-08-10 NOTE — PROGRESS NOTES
Palomo Caro presents today for   Chief Complaint   Patient presents with    Follow-up     1 year follow up       Palomo Caro preferred language for health care discussion is english/other. Is someone accompanying this pt? no    Is the patient using any DME equipment during [address]? no    Depression Screening:  3 most recent PHQ Screens 8/10/2022   Little interest or pleasure in doing things Not at all   Feeling down, depressed, irritable, or hopeless Not at all   Total Score PHQ 2 0       Learning Assessment:  Learning Assessment 8/10/2022   PRIMARY LEARNER Patient   HIGHEST LEVEL OF EDUCATION - PRIMARY LEARNER  -   BARRIERS PRIMARY LEARNER -   [facility]    NEED -   LEARNER PREFERENCE PRIMARY DEMONSTRATION     -   [facility] -   ANSWERED BY patient   RELATIONSHIP SELF       Abuse Screening:  Abuse Screening Questionnaire 8/10/2022   Do you ever feel afraid of your partner? N   Are you in a relationship with someone who physically or mentally threatens you? N   Is it safe for you to go home? Y           Pt currently taking Anticoagulant therapy? no    Pt currently taking Antiplatelet therapy ? no      Coordination of Care:  1. Have you been to the ER, urgent care clinic since your last visit? Hospitalized since your last visit? no    2. Have you seen or consulted any other health care providers outside of the [address] [place] [address] since your last visit? Include any pap smears or colon screening.  no

## 2022-08-10 NOTE — PROGRESS NOTES
Oj Keenan    F/u HL/ Prevention    HPI    Oj Keenan is a 65 y.o. male with no known cardiac disease who initially presented to me back in 2018 for dyslipidemia and chest pain, but ultimately found out it was due to cholecystitis. He has a long history of hyperlipidemia, and tells me his LDL was even 190-200s when he was in the best shape of his life/ in his 35s. He has tried a few different statins over the years but stopped because he didn't like how they made him feel. Specifically, no indication of severe rhabdo- rather vague muscle fatigue and cramping in his legs. It should be noted he has a positive FH of premature CAD in his father who had his first heart attack at approx age 62, subsequently had a stroke and ultimately  of complications/ heart failure. His mother  at age 76 of pancreatic cancer. He has no siblings, but has 3 children. He has never smoked but was exposed to some second hand smoke from his father. I started him on high intensity statin- atorvastatin and checked in on him frequently. No intolerances noticed and we achieved reduction his LDL with in 3 months. In  he ran out of meds few days before check and that was one time lipids were not at goal.    Since I last saw him, we had discussed wt loss- and wow- he really took it to heart! He bought a bike and works out 5 days a week, changed his diet and has lost a significant amount of wt. No complaints.     Past Medical History:   Diagnosis Date    GERD (gastroesophageal reflux disease)     Hypercholesterolemia      Patient Active Problem List   Diagnosis Code    Dysphagia R13.10    Family history of ischemic heart disease Z82.49    Hyperlipidemia E78.5    Onychomycosis B35.1    Severe obesity ([address] V96.97    Biliary colic T17.82     Past Surgical History:   Procedure Laterality Date    HX COLONOSCOPY      HX ORTHOPAEDIC      ankle right fx screw, right elbow dislocation pins reconstruction done HX WISDOM TEETH EXTRACTION       Current Outpatient Medications   Medication Sig Dispense Refill    atorvastatin (LIPITOR) 40 mg tablet TAKE ONE TABLET BY MOUTH ONCE DAILY 30 Tablet 6    fexofenadine-pseudoephedrine (ALLEGRA-D)  mg Tb12 Take 1 Tablet by mouth every twelve (12) hours as needed. ibuprofen (MOTRIN) 800 mg tablet Take 1 Tab by mouth every eight (8) hours as needed for Pain. Indications: pain 30 Tab 1    omeprazole (PRILOSEC) 40 mg capsule Take 1 Cap by mouth daily. Allergies   Allergen Reactions    Pcn [Penicillins] Unknown (comments)    Statins-Hmg-Coa Reductase Inhibitors Other (comments)     Generalize fatigue and body ache. Probably taken simvastatin. Family History   Problem Relation Age of Onset    Diabetes Father     Heart Attack Father 61    Stroke Father 64    Hypertension Father     Arthritis-rheumatoid Mother     Dementia Maternal Grandmother     Heart Disease Maternal Grandfather 54     Self employed    Review of Systems    Review of Systems negative unless otherwise mentioned in the HPI. Physical Exam:    Visit Vitals  /82 (BP 1 Location: Left upper arm, BP Patient Position: Sitting, BP Cuff Size: Small adult)   Pulse 86   Ht 6' (1.829 m)   Wt 105.2 kg (232 lb)   SpO2 99%   BMI 31.46 kg/m²      Wt Readings from Last 3 Encounters:   08/10/22 105.2 kg (232 lb)   08/06/21 125.6 kg (277 lb)   09/25/20 118.4 kg (261 lb)     Physical Exam  Constitutional:       General: He is not in acute distress. Appearance: He is well-developed. He is not diaphoretic. HENT:      Head: Atraumatic. Eyes:      General: No scleral icterus. Pupils: Pupils are equal, round, and reactive to light. Neck:      Thyroid: No thyromegaly. Vascular: No JVD. Cardiovascular:      Rate and Rhythm: Normal rate and regular rhythm. Heart sounds: Normal heart sounds. No murmur heard. No friction rub. No gallop. Pulmonary:      Breath sounds: Normal breath sounds. Abdominal:      Palpations: Abdomen is soft. Skin:     General: Skin is warm and dry. Coloration: Skin is not pale. Findings: No erythema or rash. Comments: No xanthomas noted   Neurological:      Mental Status: He is alert and oriented to person, place, and time. Psychiatric:         Behavior: Behavior normal.     Last  (9/7/2018)    Lab Results   Component Value Date/Time    Cholesterol, total 147 11/11/2021 12:00 AM    HDL Cholesterol 37 (L) 11/11/2021 12:00 AM    LDL, calculated 85 11/11/2021 12:00 AM    LDL, calculated 136 (H) 07/27/2020 11:22 AM    VLDL, calculated 25 11/11/2021 12:00 AM    VLDL, calculated 31 07/27/2020 11:22 AM    Triglyceride 138 11/11/2021 12:00 AM       11/01/18   ECHO STRESS 11/01/2018 11/1/2018    Narrative · Stress echocardiogram is normal. Low risk study. · Baseline ECG: Normal sinus rhythm. · Negative stress electrocardiogram.        Signed by: Jose Armando Ott MD       Impression and Plan:  Jasmyn Wong is a 65 y.o. male with:    1.) Heterozygous familial hyperlipidemia, had great/ expected goal with HI statin, continue current care, our ultimate goal will be LDL <70   2.) h/o Obesity/ preHTN, all resolved with 40lb wt loss! 3.) s/p lap louis, known    1.) Continue Atorvastatin 40 qhs (high intensity)  2.) Recheck FLP -labslip given  3.) Encouraged excellent work he's doing with fitness and diet  4.) RTC yearly    Thank you for allowing me to participate in the care of your patient, please do not hesitate to call with questions or concerns.       Kindest Regards,    Janet Orourke, DO

## 2022-09-19 ENCOUNTER — HOSPITAL ENCOUNTER (OUTPATIENT)
Dept: LAB | Age: 57
Discharge: HOME | End: 2022-09-19

## 2022-09-19 LAB — XX-LABCORP SPECIMEN COL,LCBCF: NORMAL

## 2022-09-19 PROCEDURE — 99001 SPECIMEN HANDLING PT-LAB: CPT

## 2022-09-19 NOTE — PROGRESS NOTES
Per your last note:    1.) Heterozygous familial hyperlipidemia, had great/ expected goal with HI statin, continue current care, our ultimate goal will be LDL <70   2.) h/o Obesity/ preHTN, all resolved with 40lb wt loss! 3.) s/p lap louis, known     1.) Continue Atorvastatin 40 qhs (high intensity)  2.) Recheck FLP -labslip given  3.) Encouraged excellent work he's doing with fitness and diet  4.) RTC yearly     Thank you for allowing me to participate in the care of your patient, please do not hesitate to call with questions or concerns.

## 2022-09-20 LAB
CHOLEST SERPL-MCNC: 135 MG/DL (ref 100–199)
HDLC SERPL-MCNC: 36 MG/DL
IMP & REVIEW OF LAB RESULTS: NORMAL
LDLC SERPL CALC-MCNC: 77 MG/DL (ref 0–99)
TRIGL SERPL-MCNC: 121 MG/DL (ref 0–149)
VLDLC SERPL CALC-MCNC: 22 MG/DL (ref 5–40)

## 2022-09-20 NOTE — PROGRESS NOTES
Per your last note:    1.) Heterozygous familial hyperlipidemia, had great/ expected goal with HI statin, continue current care, our ultimate goal will be LDL <70   2.) h/o Obesity/ preHTN, all resolved with 40lb wt loss!   3.) s/p lap louis, known     1.) Continue Atorvastatin 40 qhs (high intensity)  2.) Recheck FLP -labslip given  3.) Encouraged excellent work he's doing with fitness and diet  4.) RTC yearly

## 2022-09-21 ENCOUNTER — TELEPHONE (OUTPATIENT)
Dept: CARDIOLOGY CLINIC | Age: 57
End: 2022-09-21

## 2022-09-21 NOTE — TELEPHONE ENCOUNTER
----- Message from Alexia Xiao DO sent at 9/20/2022 12:23 PM EDT -----  His lipids are awesome (which we expected)  Keep up the good work and we'll see you next year  (We see him yearly with FLP prior to appt, can we check he has that ordered)  Thanks    ----- Message -----  From: Gualberto Saunders LPN  Sent: 7/14/5233  10:52 AM EDT  To: Alexia Xiao DO    Per your last note:    1.) Heterozygous familial hyperlipidemia, had great/ expected goal with HI statin, continue current care, our ultimate goal will be LDL <70   2.) h/o Obesity/ preHTN, all resolved with 40lb wt loss!   3.) s/p lap louis, known     1.) Continue Atorvastatin 40 qhs (high intensity)  2.) Recheck FLP -labslip given  3.) Encouraged excellent work he's doing with fitness and diet  4.) RTC yearly Detail Level: Simple Detail Level: Detailed Detail Level: Generalized Detail Level: Zone

## 2023-08-09 ENCOUNTER — OFFICE VISIT (OUTPATIENT)
Age: 58
End: 2023-08-09

## 2023-08-09 VITALS
DIASTOLIC BLOOD PRESSURE: 88 MMHG | HEIGHT: 72 IN | OXYGEN SATURATION: 99 % | HEART RATE: 59 BPM | BODY MASS INDEX: 32.91 KG/M2 | SYSTOLIC BLOOD PRESSURE: 128 MMHG | WEIGHT: 243 LBS

## 2023-08-09 DIAGNOSIS — E78.5 HYPERLIPIDEMIA, UNSPECIFIED HYPERLIPIDEMIA TYPE: ICD-10-CM

## 2023-08-09 DIAGNOSIS — Z82.49 FAMILY HISTORY OF ISCHEMIC HEART DISEASE AND OTHER DISEASES OF THE CIRCULATORY SYSTEM: Primary | ICD-10-CM

## 2023-08-09 DIAGNOSIS — Z82.49 FAMILY HISTORY OF ISCHEMIC HEART DISEASE AND OTHER DISEASES OF THE CIRCULATORY SYSTEM: ICD-10-CM

## 2023-08-09 RX ORDER — ASPIRIN 81 MG/1
81 TABLET, CHEWABLE ORAL NIGHTLY
Qty: 90 TABLET | Refills: 3 | Status: SHIPPED | OUTPATIENT
Start: 2023-08-09

## 2023-08-09 RX ORDER — ASPIRIN 81 MG/1
81 TABLET, CHEWABLE ORAL NIGHTLY
COMMUNITY
Start: 2022-08-09 | End: 2023-08-09 | Stop reason: SDUPTHER

## 2023-08-09 RX ORDER — ATORVASTATIN CALCIUM 40 MG/1
40 TABLET, FILM COATED ORAL NIGHTLY
Qty: 90 TABLET | Refills: 3 | Status: SHIPPED | OUTPATIENT
Start: 2023-08-09

## 2023-08-09 ASSESSMENT — PATIENT HEALTH QUESTIONNAIRE - PHQ9
1. LITTLE INTEREST OR PLEASURE IN DOING THINGS: 0
SUM OF ALL RESPONSES TO PHQ9 QUESTIONS 1 & 2: 0
2. FEELING DOWN, DEPRESSED OR HOPELESS: 0
SUM OF ALL RESPONSES TO PHQ QUESTIONS 1-9: 0

## 2023-08-09 NOTE — PROGRESS NOTES
Yuniermichael Metzger presents today for   Chief Complaint   Patient presents with    Follow-up     1 year f/u with no concerns        Yunier Metzger preferred language for health care discussion is english/other. Is someone accompanying this pt? no    Is the patient using any DME equipment during OV? no    Depression Screening:  Depression: Not at risk    PHQ-2 Score: 0        Learning Assessment:  Who is the primary learner? Patient    What is the preferred language for health care of the primary learner? ENGLISH    How does the primary learner prefer to learn new concepts? DEMONSTRATION    Answered By patient    Relationship to Learner SELF           Pt currently taking Anticoagulant therapy? no    Pt currently taking Antiplatelet therapy ? ASA 81 mg 1x nightly       Coordination of Care:  1. Have you been to the ER, urgent care clinic since your last visit? Hospitalized since your last visit? no    2. Have you seen or consulted any other health care providers outside of the [address] [place] [address] since your last visit? Include any pap smears or colon screening.  no

## 2023-08-09 NOTE — PROGRESS NOTES
Jose Reinoso    F/u HL/ Prevention    HPI    Jose Reinoso is a 65 y.o. male with no known cardiac disease who initially presented to me back in 2018 for dyslipidemia and chest pain, but ultimately found out it was due to cholecystitis. He has a long history of hyperlipidemia, and tells me his LDL was even 190-200s when he was in the best shape of his life/ in his 35s. He has tried a few different statins over the years but stopped because he didn't like how they made him feel. Specifically, no indication of severe rhabdo- rather vague muscle fatigue and cramping in his legs. It should be noted he has a positive FH of premature CAD in his father who had his first heart attack at approx age 62, subsequently had a stroke and ultimately  of complications/ heart failure. His mother  at age 76 of pancreatic cancer. He has no siblings, but has 3 children. He has never smoked but was exposed to some second hand smoke from his father. I started him on high intensity statin- atorvastatin and checked in on him frequently. No intolerances noticed and we achieved reduction his LDL with in 3 months. In  he ran out of meds few days before check and that was one time lipids were not at goal.    He bought a bike and works out 3-5 days a week, changed his diet and has lost a significant amount of wt. No complaints.     Past Medical History:   Diagnosis Date    GERD (gastroesophageal reflux disease)     Hypercholesterolemia      Patient Active Problem List   Diagnosis Code    Dysphagia R13.10    Family history of ischemic heart disease Z82.49    Hyperlipidemia E78.5    Onychomycosis B35.1    Severe obesity ([address]) F30.33    Biliary colic R82.38     Past Surgical History:   Procedure Laterality Date    HX COLONOSCOPY      HX ORTHOPAEDIC      ankle right fx screw, right elbow dislocation pins reconstruction done    HX WISDOM TEETH EXTRACTION       Current Outpatient Medications   Medication Sig

## 2024-08-23 ENCOUNTER — OFFICE VISIT (OUTPATIENT)
Age: 59
End: 2024-08-23
Payer: COMMERCIAL

## 2024-08-23 ENCOUNTER — HOSPITAL ENCOUNTER (OUTPATIENT)
Facility: HOSPITAL | Age: 59
Discharge: HOME | End: 2024-08-26

## 2024-08-23 VITALS
DIASTOLIC BLOOD PRESSURE: 72 MMHG | OXYGEN SATURATION: 98 % | HEIGHT: 72 IN | SYSTOLIC BLOOD PRESSURE: 122 MMHG | BODY MASS INDEX: 35.21 KG/M2 | WEIGHT: 260 LBS | HEART RATE: 76 BPM

## 2024-08-23 DIAGNOSIS — E78.5 HYPERLIPIDEMIA, UNSPECIFIED HYPERLIPIDEMIA TYPE: ICD-10-CM

## 2024-08-23 DIAGNOSIS — Z82.49 FAMILY HISTORY OF ISCHEMIC HEART DISEASE AND OTHER DISEASES OF THE CIRCULATORY SYSTEM: ICD-10-CM

## 2024-08-23 DIAGNOSIS — E78.5 HYPERLIPIDEMIA, UNSPECIFIED HYPERLIPIDEMIA TYPE: Primary | ICD-10-CM

## 2024-08-23 LAB — LABCORP SPECIMEN COLLECTION: NORMAL

## 2024-08-23 PROCEDURE — 93000 ELECTROCARDIOGRAM COMPLETE: CPT | Performed by: INTERNAL MEDICINE

## 2024-08-23 PROCEDURE — 99214 OFFICE O/P EST MOD 30 MIN: CPT | Performed by: INTERNAL MEDICINE

## 2024-08-23 PROCEDURE — 99001 SPECIMEN HANDLING PT-LAB: CPT

## 2024-08-23 RX ORDER — ATORVASTATIN CALCIUM 40 MG/1
40 TABLET, FILM COATED ORAL NIGHTLY
Qty: 90 TABLET | Refills: 3 | Status: SHIPPED | OUTPATIENT
Start: 2024-08-23

## 2024-08-23 ASSESSMENT — PATIENT HEALTH QUESTIONNAIRE - PHQ9
SUM OF ALL RESPONSES TO PHQ QUESTIONS 1-9: 0
SUM OF ALL RESPONSES TO PHQ9 QUESTIONS 1 & 2: 0
2. FEELING DOWN, DEPRESSED OR HOPELESS: NOT AT ALL
1. LITTLE INTEREST OR PLEASURE IN DOING THINGS: NOT AT ALL
SUM OF ALL RESPONSES TO PHQ QUESTIONS 1-9: 0

## 2024-08-23 ASSESSMENT — ANXIETY QUESTIONNAIRES
3. WORRYING TOO MUCH ABOUT DIFFERENT THINGS: NOT AT ALL
7. FEELING AFRAID AS IF SOMETHING AWFUL MIGHT HAPPEN: NOT AT ALL
IF YOU CHECKED OFF ANY PROBLEMS ON THIS QUESTIONNAIRE, HOW DIFFICULT HAVE THESE PROBLEMS MADE IT FOR YOU TO DO YOUR WORK, TAKE CARE OF THINGS AT HOME, OR GET ALONG WITH OTHER PEOPLE: NOT DIFFICULT AT ALL
1. FEELING NERVOUS, ANXIOUS, OR ON EDGE: NOT AT ALL
GAD7 TOTAL SCORE: 0
5. BEING SO RESTLESS THAT IT IS HARD TO SIT STILL: NOT AT ALL
4. TROUBLE RELAXING: NOT AT ALL
6. BECOMING EASILY ANNOYED OR IRRITABLE: NOT AT ALL
2. NOT BEING ABLE TO STOP OR CONTROL WORRYING: NOT AT ALL

## 2024-08-23 NOTE — PROGRESS NOTES
Jak Lock presents today for   Chief Complaint   Patient presents with    Follow-up     1 year       Jak Lock preferred language for health care discussion is english/other.    Is someone accompanying this pt? no    Is the patient using any DME equipment during OV? no    Depression Screening:  Depression: Not at risk (8/23/2024)    PHQ-2     PHQ-2 Score: 0        Learning Assessment:  No question data found.       Pt currently taking Anticoagulant therapy? no    Pt currently taking Antiplatelet therapy ? Aspirin 81 mg daily      Coordination of Care:  1. Have you been to the ER, urgent care clinic since your last visit? Hospitalized since your last visit? no    2. Have you seen or consulted any other health care providers outside of the [facility] since your last visit? Include any pap smears or colon screening. no    
Dispense Refill    atorvastatin (LIPITOR) 40 mg tablet TAKE ONE TABLET BY MOUTH ONCE DAILY 30 Tablet 6    fexofenadine-pseudoephedrine (ALLEGRA-D)  mg Tb12 Take 1 Tablet by mouth every twelve (12) hours as needed.      ibuprofen (MOTRIN) 800 mg tablet Take 1 Tab by mouth every eight (8) hours as needed for Pain. Indications: pain 30 Tab 1    omeprazole (PRILOSEC) 40 mg capsule Take 1 Cap by mouth daily.       Allergies   Allergen Reactions    Pcn [Penicillins] Unknown (comments)    Statins-Hmg-Coa Reductase Inhibitors Other (comments)     Generalize fatigue and body ache. Probably taken simvastatin.         Family History   Problem Relation Age of Onset    Diabetes Father     Heart Attack Father 60    Stroke Father 61    Hypertension Father     Arthritis-rheumatoid Mother     Dementia Maternal Grandmother     Heart Disease Maternal Grandfather 55     Self employed    Review of Systems    Review of Systems negative unless otherwise mentioned in the HPI.    Physical Exam:    Visit Vitals  /82 (BP 1 Location: Left upper arm, BP Patient Position: Sitting, BP Cuff Size: Small adult)   Pulse 86   Ht 6' (1.829 m)   Wt 105.2 kg (232 lb)   SpO2 99%   BMI 31.46 kg/m²      Wt Readings from Last 3 Encounters:   08/10/22 105.2 kg (232 lb)   08/06/21 125.6 kg (277 lb)   09/25/20 118.4 kg (261 lb)     Physical Exam  Constitutional:       General: He is not in acute distress.     Appearance: He is well-developed. He is not diaphoretic.   HENT:      Head: Atraumatic.   Eyes:      General: No scleral icterus.     Pupils: Pupils are equal, round, and reactive to light.   Neck:      Thyroid: No thyromegaly.      Vascular: No JVD.   Cardiovascular:      Rate and Rhythm: Normal rate and regular rhythm.      Heart sounds: Normal heart sounds. No murmur heard.    No friction rub. No gallop.   Pulmonary:      Breath sounds: Normal breath sounds.   Abdominal:      Palpations: Abdomen is soft.   Skin:     General: Skin is warm and

## 2024-08-24 LAB
CHOLEST SERPL-MCNC: 149 MG/DL (ref 100–199)
HDLC SERPL-MCNC: 36 MG/DL
LDLC SERPL CALC-MCNC: 87 MG/DL (ref 0–99)
TRIGL SERPL-MCNC: 145 MG/DL (ref 0–149)
VLDLC SERPL CALC-MCNC: 26 MG/DL (ref 5–40)

## 2024-08-30 ENCOUNTER — TELEPHONE (OUTPATIENT)
Age: 59
End: 2024-08-30

## 2024-08-30 NOTE — TELEPHONE ENCOUNTER
----- Message from Dr. Abigail Patel DO sent at 8/30/2024  2:33 PM EDT -----  Cholesterol #s are fine, still good control (though slightly higher when compared to last years #s)  ----- Message -----  From: Margo Morrison, RN  Sent: 8/29/2024   9:20 AM EDT  To: Abigail Patel DO    Per your last office note:    1.) Heterozygous familial hyperlipidemia, had great/ expected goal with HI statin, continue current care, our ultimate goal will be LDL <70   2.) h/o Obesity/ preHTN, all resolved with 40lb wt loss!  3.) s/p lap stephen, known     1.) Continue Atorvastatin 40 qhs (high intensity)  2.) Recheck FLP -labslip given for labs  3.) Encouraged continued exercise, RTC yearly

## 2025-08-22 ENCOUNTER — OFFICE VISIT (OUTPATIENT)
Age: 60
End: 2025-08-22
Payer: COMMERCIAL

## 2025-08-22 VITALS
BODY MASS INDEX: 34.95 KG/M2 | DIASTOLIC BLOOD PRESSURE: 80 MMHG | HEIGHT: 72 IN | HEART RATE: 78 BPM | SYSTOLIC BLOOD PRESSURE: 128 MMHG | OXYGEN SATURATION: 98 % | WEIGHT: 258 LBS

## 2025-08-22 DIAGNOSIS — Z13.21 ENCOUNTER FOR VITAMIN DEFICIENCY SCREENING: ICD-10-CM

## 2025-08-22 DIAGNOSIS — Z13.1 SCREENING FOR DIABETES MELLITUS: ICD-10-CM

## 2025-08-22 DIAGNOSIS — Z82.49 FAMILY HISTORY OF ISCHEMIC HEART DISEASE AND OTHER DISEASES OF THE CIRCULATORY SYSTEM: ICD-10-CM

## 2025-08-22 DIAGNOSIS — Z13.29 SCREENING FOR THYROID DISORDER: ICD-10-CM

## 2025-08-22 DIAGNOSIS — E78.5 HYPERLIPIDEMIA, UNSPECIFIED HYPERLIPIDEMIA TYPE: Primary | ICD-10-CM

## 2025-08-22 DIAGNOSIS — E78.5 HYPERLIPIDEMIA, UNSPECIFIED HYPERLIPIDEMIA TYPE: ICD-10-CM

## 2025-08-22 PROCEDURE — 99214 OFFICE O/P EST MOD 30 MIN: CPT | Performed by: INTERNAL MEDICINE

## 2025-08-22 PROCEDURE — 93000 ELECTROCARDIOGRAM COMPLETE: CPT | Performed by: INTERNAL MEDICINE

## 2025-08-22 ASSESSMENT — PATIENT HEALTH QUESTIONNAIRE - PHQ9
SUM OF ALL RESPONSES TO PHQ QUESTIONS 1-9: 0
2. FEELING DOWN, DEPRESSED OR HOPELESS: NOT AT ALL
1. LITTLE INTEREST OR PLEASURE IN DOING THINGS: NOT AT ALL
SUM OF ALL RESPONSES TO PHQ QUESTIONS 1-9: 0

## 2025-09-02 RX ORDER — ATORVASTATIN CALCIUM 40 MG/1
40 TABLET, FILM COATED ORAL NIGHTLY
Qty: 90 TABLET | Refills: 3 | Status: SHIPPED | OUTPATIENT
Start: 2025-09-02

## (undated) DEVICE — SYR 10ML LUER LOK 1/5ML GRAD --

## (undated) DEVICE — APPLIER CLP L SHFT DIA12MM 20 ROT MULT LIGACLP

## (undated) DEVICE — CATHETER IV 14GA L2IN POLYUR STR ORNG HUB SFTY RADPQ DISP

## (undated) DEVICE — CATHETER URET 4FR L70CM SGL LUMN W/ HUB OPN END FLEXIMA

## (undated) DEVICE — SOLUTION IRRIG 1000ML H2O STRL BLT

## (undated) DEVICE — SOLUTION IV 1000ML 0.9% SOD CHL

## (undated) DEVICE — TROCAR ENDOSCP BLDELSS 12X100 MM W/ HNDL STBL SL OPT TIP

## (undated) DEVICE — SLEEVE TRCR L100MM DIA5MM UNIV STBL FOR BLDELSS DIL TIP

## (undated) DEVICE — REM POLYHESIVE ADULT PATIENT RETURN ELECTRODE: Brand: VALLEYLAB

## (undated) DEVICE — FLEX ADVANTAGE 3000CC: Brand: FLEX ADVANTAGE

## (undated) DEVICE — DRAPE XR C ARM 41X74IN LF --

## (undated) DEVICE — TISSUE RETRIEVAL SYSTEM: Brand: INZII RETRIEVAL SYSTEM

## (undated) DEVICE — PACK PROCEDURE SURG LAPAROSCOPY 17X7 MM BRTRC PRIMUS

## (undated) DEVICE — TROCAR LAP L100MM DIA5MM BLDELSS W/ STBL SL ENDOPATH XCEL

## (undated) DEVICE — GAUZE SPONGES,8 PLY: Brand: CURITY

## (undated) DEVICE — SUTURE MCRYL SZ 4-0 L27IN ABSRB UD L24MM PS-1 3/8 CIR PRIM Y935H

## (undated) DEVICE — SCISSORS RMFG LAPAROSC 5MM -- LAWSON OEM ITEM 112765

## (undated) DEVICE — TROCAR ENDOSCP L100MM DIA12MM STBL SL BLDELSS ENDOPATH XCEL

## (undated) DEVICE — SET SUCT IRR TIP DISP STRYKEFLOW2

## (undated) DEVICE — KIT CLN UP BON SECOURS MARYV

## (undated) DEVICE — SUTURE VCRL SZ 2-0 L54IN ABSRB VLT W/O NDL POLYGLACTIN 910 J618H